# Patient Record
Sex: MALE | Race: ASIAN | NOT HISPANIC OR LATINO | Employment: OTHER | URBAN - METROPOLITAN AREA
[De-identification: names, ages, dates, MRNs, and addresses within clinical notes are randomized per-mention and may not be internally consistent; named-entity substitution may affect disease eponyms.]

---

## 2017-10-10 ENCOUNTER — OFFICE VISIT (OUTPATIENT)
Dept: SURGERY | Facility: CLINIC | Age: 57
End: 2017-10-10

## 2017-10-10 VITALS
HEIGHT: 72 IN | WEIGHT: 315 LBS | BODY MASS INDEX: 42.66 KG/M2 | SYSTOLIC BLOOD PRESSURE: 162 MMHG | DIASTOLIC BLOOD PRESSURE: 88 MMHG

## 2017-10-10 DIAGNOSIS — R10.9 RIGHT FLANK PAIN: Primary | ICD-10-CM

## 2017-10-10 DIAGNOSIS — R13.19 ESOPHAGEAL DYSPHAGIA: ICD-10-CM

## 2017-10-10 PROBLEM — I10 HYPERTENSION: Status: ACTIVE | Noted: 2017-04-26

## 2017-10-10 PROBLEM — I10 BENIGN ESSENTIAL HYPERTENSION: Status: ACTIVE | Noted: 2017-10-10

## 2017-10-10 PROBLEM — E03.9 HYPOTHYROID: Status: ACTIVE | Noted: 2017-07-26

## 2017-10-10 PROBLEM — F41.9 ANXIETY: Status: ACTIVE | Noted: 2017-10-10

## 2017-10-10 PROBLEM — M48.02 CERVICAL STENOSIS OF SPINE: Status: ACTIVE | Noted: 2017-09-11

## 2017-10-10 PROBLEM — F52.21 ERECTILE DYSFUNCTION OF NONORGANIC ORIGIN: Status: ACTIVE | Noted: 2017-10-10

## 2017-10-10 PROBLEM — G47.00 INSOMNIA: Status: ACTIVE | Noted: 2017-10-10

## 2017-10-10 PROBLEM — E29.1 HYPOGONADISM IN MALE: Status: ACTIVE | Noted: 2017-07-26

## 2017-10-10 PROCEDURE — 99202 OFFICE O/P NEW SF 15 MIN: CPT | Performed by: SURGERY

## 2017-10-10 NOTE — PROGRESS NOTES
PATIENT INFORMATION  Larry Walters  C/o bulge on R flank, has had for a while, ABD pain recently, no recent imaging     - 1960    CHIEF COMPLAINT  Chief Complaint   Patient presents with   • Abdominal Pain       HISTORY OF PRESENT ILLNESS  HPI he complains of progressive swelling and pain in his right flank.  He noticed this after his laparoscopic gastric bypass and it has increased.  He also complains of some food getting stuck when he swallows.        REVIEW OF SYSTEMS  Review of Systems   Constitutional: Negative.    HENT: Negative.    Eyes: Negative.    Respiratory: Negative.    Cardiovascular: Negative.    Gastrointestinal: Positive for abdominal pain.        Less frequent   Endocrine: Negative.    Genitourinary: Negative.    Musculoskeletal: Negative.    Skin: Negative.    Allergic/Immunologic: Negative.    Neurological: Negative.    Hematological: Negative.    Psychiatric/Behavioral: Negative.          ACTIVE PROBLEMS  Patient Active Problem List    Diagnosis   • Anxiety [F41.9]   • Benign essential hypertension [I10]   • Erectile dysfunction of nonorganic origin [F52.21]   • Insomnia [G47.00]   • Cervical stenosis of spine [M48.02]   • Hypogonadism in male [E29.1]   • Hypothyroid [E03.9]   • Hypertension [I10]         PAST MEDICAL HISTORY  Past Medical History:   Diagnosis Date   • Knee swelling          SURGICAL HISTORY  History reviewed. No pertinent surgical history.      FAMILY HISTORY  Family History   Problem Relation Age of Onset   • Diabetes Mother    • Heart failure Mother    • Hypertension Mother    • Heart disease Father          SOCIAL HISTORY  Social History     Occupational History   • Not on file.     Social History Main Topics   • Smoking status: Never Smoker   • Smokeless tobacco: Never Used   • Alcohol use Yes      Comment: occ.   • Drug use: Not on file   • Sexual activity: Not on file         CURRENT MEDICATIONS    Current Outpatient Prescriptions:   •  diclofenac (VOLTAREN) 75 MG  "EC tablet, TAKE ONE TABLET BY MOUTH TWICE A DAY, Disp: 60 tablet, Rfl: 0  •  furosemide (LASIX) 40 MG tablet, TAKE ONE TABLET BY MOUTH EVERY DAY, Disp: 90 tablet, Rfl: 0  •  LORazepam (ATIVAN) 2 MG tablet, TAKE ONE TABLET BY MOUTH EVERY 6 HOURS, Disp: 90 tablet, Rfl: 0    ALLERGIES  Review of patient's allergies indicates no known allergies.    VITALS  Vitals:    10/10/17 0832   BP: 162/88   Weight: (!) 333 lb (151 kg)   Height: 72\" (182.9 cm)       LAST RESULTS   Conversion Encounter on 09/18/2015   Component Date Value Ref Range Status   • WBC 09/18/2015 4.83  4.80 - 10.80 K/cumm Final   • RBC 09/18/2015 4.35* 4.70 - 6.10 Million Final   • Hemoglobin 09/18/2015 8.2* 14.0 - 18.0 g/dL Final   • Hematocrit 09/18/2015 30.6* 42.0 - 52.0 % Final   • MCV 09/18/2015 70.3* 80.0 - 94.0 fL Final   • MCH 09/18/2015 18.9* 27.0 - 31.0 pg Final   • MCHC 09/18/2015 26.8* 31.0 - 37.0 g/dL Final   • RDW 09/18/2015 18.5* 11.5 - 14.5 % Final   • Platelets 09/18/2015 361  140 - 500 K/cumm Final   • Neutrophil Rel % 09/18/2015 62  45 - 70 % Final   • Lymphocyte Rel % 09/18/2015 27  20 - 45 % Final   • Monocyte Rel % 09/18/2015 8  3 - 8 % Final   • Eosinophil Rel % 09/18/2015 3  0 - 4 % Final   • Basophil Rel % 09/18/2015 1  0 - 2 % Final   • Neutrophils Absolute 09/18/2015 2.99  1.50 - 8.30 K/cumm Final   • Lymphocytes Absolute 09/18/2015 1.30  0.60 - 4.80 K/cumm Final   • Monocytes Absolute 09/18/2015 0.36  0.00 - 1.00 K/cumm Final   • Eosinophils Absolute 09/18/2015 0.14  0.10 - 0.30 K/cumm Final   • Basophils Absolute 09/18/2015 0.03  0.00 - 0.20 K/cumm Final   • Immature Granulocyte Rel % 09/18/2015 0.0  0.0 - 0.6 % Final   • Glucose 09/18/2015 92  65 - 99 mg/dL Final   • BUN 09/18/2015 15  6 - 20 mg/dL Final   • Creatinine 09/18/2015 0.70* 0.76 - 1.27 mg/dL Final   • eGFR Non  Am 09/18/2015 >60  mL/min/1.732 Final   • eGFR African Am 09/18/2015 >60  mL/min/1.732 Final   • BUN/Creatinine Ratio 09/18/2015 21   Final   • " Sodium 09/18/2015 142  136 - 145 mmol/L Final   • Potassium 09/18/2015 4.3  3.5 - 5.2 mmol/L Final   • Chloride 09/18/2015 103  98 - 107 mmol/L Final   • Total CO2 09/18/2015 24  22 - 29 mmol/L Final   • Calcium 09/18/2015 8.9  8.6 - 10.5 mg/dL Final   • Total Protein 09/18/2015 7.4  6.0 - 8.5 g/dL Final   • Albumin 09/18/2015 4.2  3.5 - 5.2 g/dL Final   • Globulin 09/18/2015 3.2  g/dL Final   • A/G Ratio 09/18/2015 1   Final   • Total Bilirubin 09/18/2015 0.3  0.1 - 1.2 mg/dL Final   • Alkaline Phosphatase 09/18/2015 79  40 - 129 U/L Final   • AST (SGOT) 09/18/2015 34  5 - 40 U/L Final   • ALT (SGPT) 09/18/2015 28  5 - 41 U/L Final   • PSA 09/18/2015 1.15  0.00 - 4.00 ng/mL Final     No results found.    PHYSICAL EXAM  Physical Exam overweight white male in no active distress.  He has asymmetry of his right flank with a probable flank hernia.  His abdominal exam is benign.    ASSESSMENT  Flank pain probable hernia, dysphagia      PLAN  The risks benefits and options were discussed with the patient in detail.  Feel that we should proceed with a CT scan of his abdomen and pelvis to delineate the anatomy for possible flank hernia.  We will check an upper GI with barium swallow for his dysphagia.  He may need to see gastroenterology.  I will see him back after the study is complete.

## 2017-10-13 ENCOUNTER — APPOINTMENT (OUTPATIENT)
Dept: CT IMAGING | Facility: HOSPITAL | Age: 57
End: 2017-10-13
Attending: SURGERY

## 2017-10-13 ENCOUNTER — HOSPITAL ENCOUNTER (OUTPATIENT)
Dept: CT IMAGING | Facility: HOSPITAL | Age: 57
Discharge: HOME | End: 2017-10-13
Attending: SURGERY | Admitting: SURGERY

## 2017-10-13 DIAGNOSIS — R10.9 RIGHT FLANK PAIN: ICD-10-CM

## 2017-10-13 PROCEDURE — 74176 CT ABD & PELVIS W/O CONTRAST: CPT

## 2017-10-16 ENCOUNTER — HOSPITAL ENCOUNTER (OUTPATIENT)
Dept: GENERAL RADIOLOGY | Facility: HOSPITAL | Age: 57
Discharge: HOME | End: 2017-10-16
Attending: SURGERY | Admitting: SURGERY

## 2017-10-16 DIAGNOSIS — R13.19 ESOPHAGEAL DYSPHAGIA: ICD-10-CM

## 2017-10-16 PROCEDURE — 74247: CPT

## 2017-10-17 ENCOUNTER — OFFICE VISIT (OUTPATIENT)
Dept: SURGERY | Facility: CLINIC | Age: 57
End: 2017-10-17

## 2017-10-17 VITALS
DIASTOLIC BLOOD PRESSURE: 100 MMHG | SYSTOLIC BLOOD PRESSURE: 182 MMHG | HEIGHT: 72 IN | WEIGHT: 315 LBS | BODY MASS INDEX: 42.66 KG/M2

## 2017-10-17 DIAGNOSIS — K40.20 NON-RECURRENT BILATERAL INGUINAL HERNIA WITHOUT OBSTRUCTION OR GANGRENE: ICD-10-CM

## 2017-10-17 DIAGNOSIS — K45.8 LUMBAR HERNIA: Primary | ICD-10-CM

## 2017-10-17 PROCEDURE — 99212 OFFICE O/P EST SF 10 MIN: CPT | Performed by: SURGERY

## 2017-10-17 NOTE — PROGRESS NOTES
PATIENT INFORMATION  Larry Walters   F/U CT SCAN AND UGI, SX UNCHANGED SINCE LAST OV     - 1960    CHIEF COMPLAINT  Chief Complaint   Patient presents with   • Follow-up       HISTORY OF PRESENT ILLNESS  HPI swelling and intermittent discomfort in his right flank.  He says that food occasionally gets stuck when he swallows.  He denies any groin swelling or pain.        REVIEW OF SYSTEMS  Review of Systems knee pain and bilateral shoulder pain, neck pain      ACTIVE PROBLEMS  Patient Active Problem List    Diagnosis   • Anxiety [F41.9]   • Benign essential hypertension [I10]   • Erectile dysfunction of nonorganic origin [F52.21]   • Insomnia [G47.00]   • Cervical stenosis of spine [M48.02]   • Hypogonadism in male [E29.1]   • Hypothyroid [E03.9]   • Hypertension [I10]         PAST MEDICAL HISTORY  Past Medical History:   Diagnosis Date   • Knee swelling          SURGICAL HISTORY  No past surgical history on file.      FAMILY HISTORY  Family History   Problem Relation Age of Onset   • Diabetes Mother    • Heart failure Mother    • Hypertension Mother    • Heart disease Father          SOCIAL HISTORY  Social History     Occupational History   • Not on file.     Social History Main Topics   • Smoking status: Never Smoker   • Smokeless tobacco: Never Used   • Alcohol use Yes      Comment: occ.   • Drug use: Not on file   • Sexual activity: Not on file         CURRENT MEDICATIONS    Current Outpatient Prescriptions:   •  diclofenac (VOLTAREN) 75 MG EC tablet, TAKE ONE TABLET BY MOUTH TWICE A DAY, Disp: 60 tablet, Rfl: 0  •  furosemide (LASIX) 40 MG tablet, TAKE ONE TABLET BY MOUTH EVERY DAY, Disp: 90 tablet, Rfl: 0  •  LORazepam (ATIVAN) 2 MG tablet, TAKE ONE TABLET BY MOUTH EVERY 6 HOURS, Disp: 90 tablet, Rfl: 0    ALLERGIES  Review of patient's allergies indicates no known allergies.    VITALS  There were no vitals filed for this visit.    LAST RESULTS   Conversion Encounter on 2015   Component Date Value  Ref Range Status   • WBC 09/18/2015 4.83  4.80 - 10.80 K/cumm Final   • RBC 09/18/2015 4.35* 4.70 - 6.10 Million Final   • Hemoglobin 09/18/2015 8.2* 14.0 - 18.0 g/dL Final   • Hematocrit 09/18/2015 30.6* 42.0 - 52.0 % Final   • MCV 09/18/2015 70.3* 80.0 - 94.0 fL Final   • MCH 09/18/2015 18.9* 27.0 - 31.0 pg Final   • MCHC 09/18/2015 26.8* 31.0 - 37.0 g/dL Final   • RDW 09/18/2015 18.5* 11.5 - 14.5 % Final   • Platelets 09/18/2015 361  140 - 500 K/cumm Final   • Neutrophil Rel % 09/18/2015 62  45 - 70 % Final   • Lymphocyte Rel % 09/18/2015 27  20 - 45 % Final   • Monocyte Rel % 09/18/2015 8  3 - 8 % Final   • Eosinophil Rel % 09/18/2015 3  0 - 4 % Final   • Basophil Rel % 09/18/2015 1  0 - 2 % Final   • Neutrophils Absolute 09/18/2015 2.99  1.50 - 8.30 K/cumm Final   • Lymphocytes Absolute 09/18/2015 1.30  0.60 - 4.80 K/cumm Final   • Monocytes Absolute 09/18/2015 0.36  0.00 - 1.00 K/cumm Final   • Eosinophils Absolute 09/18/2015 0.14  0.10 - 0.30 K/cumm Final   • Basophils Absolute 09/18/2015 0.03  0.00 - 0.20 K/cumm Final   • Immature Granulocyte Rel % 09/18/2015 0.0  0.0 - 0.6 % Final   • Glucose 09/18/2015 92  65 - 99 mg/dL Final   • BUN 09/18/2015 15  6 - 20 mg/dL Final   • Creatinine 09/18/2015 0.70* 0.76 - 1.27 mg/dL Final   • eGFR Non  Am 09/18/2015 >60  mL/min/1.732 Final   • eGFR African Am 09/18/2015 >60  mL/min/1.732 Final   • BUN/Creatinine Ratio 09/18/2015 21   Final   • Sodium 09/18/2015 142  136 - 145 mmol/L Final   • Potassium 09/18/2015 4.3  3.5 - 5.2 mmol/L Final   • Chloride 09/18/2015 103  98 - 107 mmol/L Final   • Total CO2 09/18/2015 24  22 - 29 mmol/L Final   • Calcium 09/18/2015 8.9  8.6 - 10.5 mg/dL Final   • Total Protein 09/18/2015 7.4  6.0 - 8.5 g/dL Final   • Albumin 09/18/2015 4.2  3.5 - 5.2 g/dL Final   • Globulin 09/18/2015 3.2  g/dL Final   • A/G Ratio 09/18/2015 1   Final   • Total Bilirubin 09/18/2015 0.3  0.1 - 1.2 mg/dL Final   • Alkaline Phosphatase 09/18/2015 79  40 -  129 U/L Final   • AST (SGOT) 09/18/2015 34  5 - 40 U/L Final   • ALT (SGPT) 09/18/2015 28  5 - 41 U/L Final   • PSA 09/18/2015 1.15  0.00 - 4.00 ng/mL Final     Ct Abdomen Pelvis Without Contrast    Result Date: 10/13/2017  Narrative: CT ABDOMEN AND PELVIS, NONCONTRAST, 10/13/2017:  HISTORY: 57-year-old male with hernia-like bulge in the right flank, present for several years but enlarging recently.  TECHNIQUE: CT examination of the abdomen and pelvis without oral or IV contrast. Radiation dose reduction techniques were utilized, including automated exposure control and exposure modulation based on body size.  ABDOMEN FINDINGS: There is a full-thickness gap in the posterolateral right mid abdominal wall involving all three muscular layers. In axial plane, this gap measures about 5.5 cm. The longitudinal extent of this opening measures about 11.5 cm measured in coronal plane. Transversalis fascia is intact. There is outward bulging of the entire right lateral abdominal wall and abdominal contents including abdominal retroperitoneal fat, bowel and the right inferior liver margin.  Postop changes gastric bypass. No distal esophageal dilatation or gastric distention. Liver, pancreas, spleen and kidneys are within normal limits. No bile duct dilatation. Normal caliber abdominal aorta.  Small bowel and colon are normal in caliber and appearance without contrast. Normal appendix.  PELVIS FINDINGS: Small bilateral inguinal hernias containing pelvic fat. Urinary bladder, prostate and rectum are within normal limits.  Limited lung base images show no active disease. Severe multilevel degenerative disc disease, greatest at L1-2, L2-3 and L5-S1 along with severe degenerative facet arthropathy throughout the lumbar spine.      Impression: 1. Broad-based right lateral abdominal wall hernia as detailed above. 2. Small bilateral fat-containing inguinal hernias. 3. Postop changes gastric bypass.  This report was finalized on  10/13/2017 12:33 PM by Dr. King Isaac MD.      Fl Upper Gi W Air Contrast And Kub    Result Date: 10/16/2017  Narrative: INDICATION: Food sticking in the throat for one month. Prior gastric bypass 10 years ago.  TECHNIQUE:  AIR CONTRAST UPPER GI SERIES  COMPARISON: CT abdomen pelvis 10/13/2017  FINDINGS:  ESOPHAGUS:    Motility:  There is normal motility in the upper one half of the thoracic esophagus. There is mild generalized dysmotility the lower one half of the esophagus.    Mucosa:  No stricture, mass, extrinsic mass effect. There is some mild tortuosity of the thoracic esophagus.   Distensibility: The distal thoracic esophagus is mildly distended, however no stricture is identified.  GASTROESOPHAGEAL JUNCTION:  Patient's undergone prior gastric bypass procedure. A surgical anastomoses are unremarkable.  GASTROESOPHAGEAL REFLUX:  None observed.  Stomach and duodenum are not assessed given the gastric bypass procedure.  Fluoroscopic time 1.48 minutes, 40 images acquired.      Impression:  Mid to distal esophageal dysmotility and mild esophageal distention.  This report was finalized on 10/16/2017 9:20 AM by Dr. Bret Soria MD.        PHYSICAL EXAM  Physical Exam overweight male in no active distress.  His parents swallow upper GI showed his anastomoses to be okay there was no evidence of any stricture.  It did show some dysmotility in the lower third of his esophagus.  CT scan of the abdomen showed a large right flank hernia and small bilateral inguinal hernias with fatty contents.  I reviewed the CT scan myself    ASSESSMENT  Lumbar hernia, bilateral inguinal hernia      PLAN  I discussed the risks benefits and options with him in detail.  He is asymptomatic from the groin hernias and as such we will just observe him for that.  I discussed signs and symptoms to look for.  As far as the right flank hernia goes this would be difficult to repair with approximately 40% recurrence rate.  I discussed his  options with this in detail.  He will defer that until after he gets some of his other shoulder knee and neck surgeries complete.  He would be limited in his physical therapy because of the hernia repair if he would proceed now with that.  I feel the hernia has a low risk of incarceration.  He will give me a call after he's had some the other procedures accomplished to plan his surgical intervention for his hernia.  As far as he esophagus goes I offered to refer him to gastroenterology and he deferred at this point.

## 2017-10-20 ENCOUNTER — APPOINTMENT (OUTPATIENT)
Dept: GENERAL RADIOLOGY | Facility: HOSPITAL | Age: 57
End: 2017-10-20
Attending: SURGERY

## 2017-10-23 ENCOUNTER — APPOINTMENT (OUTPATIENT)
Dept: GENERAL RADIOLOGY | Facility: HOSPITAL | Age: 57
End: 2017-10-23
Attending: SURGERY

## 2018-05-26 ENCOUNTER — LAB REQUISITION (OUTPATIENT)
Dept: LAB | Facility: HOSPITAL | Age: 58
End: 2018-05-26

## 2018-05-26 DIAGNOSIS — Z00.00 ROUTINE GENERAL MEDICAL EXAMINATION AT A HEALTH CARE FACILITY: ICD-10-CM

## 2018-05-26 LAB
ANION GAP SERPL CALCULATED.3IONS-SCNC: 13.9 MMOL/L
BACTERIA UR QL AUTO: ABNORMAL /HPF
BASOPHILS # BLD AUTO: 0.03 10*3/MM3 (ref 0–0.2)
BASOPHILS NFR BLD AUTO: 0.7 % (ref 0–1.5)
BILIRUB UR QL STRIP: NEGATIVE
BUN BLD-MCNC: 10 MG/DL (ref 6–20)
BUN/CREAT SERPL: 13.5 (ref 7–25)
CALCIUM SPEC-SCNC: 8.8 MG/DL (ref 8.6–10.5)
CHLORIDE SERPL-SCNC: 104 MMOL/L (ref 98–107)
CLARITY UR: CLEAR
CO2 SERPL-SCNC: 23.1 MMOL/L (ref 22–29)
COLOR UR: YELLOW
CREAT BLD-MCNC: 0.74 MG/DL (ref 0.76–1.27)
DEPRECATED RDW RBC AUTO: 46.2 FL (ref 37–54)
EOSINOPHIL # BLD AUTO: 0.13 10*3/MM3 (ref 0–0.7)
EOSINOPHIL NFR BLD AUTO: 2.8 % (ref 0.3–6.2)
ERYTHROCYTE [DISTWIDTH] IN BLOOD BY AUTOMATED COUNT: 14 % (ref 11.5–14.5)
GFR SERPL CREATININE-BSD FRML MDRD: 109 ML/MIN/1.73
GLUCOSE BLD-MCNC: 92 MG/DL (ref 65–99)
GLUCOSE UR STRIP-MCNC: NEGATIVE MG/DL
HCT VFR BLD AUTO: 43.8 % (ref 40.4–52.2)
HGB BLD-MCNC: 14.5 G/DL (ref 13.7–17.6)
HGB UR QL STRIP.AUTO: ABNORMAL
HYALINE CASTS UR QL AUTO: ABNORMAL /LPF
IMM GRANULOCYTES # BLD: 0 10*3/MM3 (ref 0–0.03)
IMM GRANULOCYTES NFR BLD: 0 % (ref 0–0.5)
KETONES UR QL STRIP: NEGATIVE
LEUKOCYTE ESTERASE UR QL STRIP.AUTO: NEGATIVE
LYMPHOCYTES # BLD AUTO: 1.14 10*3/MM3 (ref 0.9–4.8)
LYMPHOCYTES NFR BLD AUTO: 24.9 % (ref 19.6–45.3)
MCH RBC QN AUTO: 30 PG (ref 27–32.7)
MCHC RBC AUTO-ENTMCNC: 33.1 G/DL (ref 32.6–36.4)
MCV RBC AUTO: 90.7 FL (ref 79.8–96.2)
MONOCYTES # BLD AUTO: 0.52 10*3/MM3 (ref 0.2–1.2)
MONOCYTES NFR BLD AUTO: 11.4 % (ref 5–12)
NEUTROPHILS # BLD AUTO: 2.76 10*3/MM3 (ref 1.9–8.1)
NEUTROPHILS NFR BLD AUTO: 60.2 % (ref 42.7–76)
NITRITE UR QL STRIP: NEGATIVE
PH UR STRIP.AUTO: <=5 [PH] (ref 5–8)
PLATELET # BLD AUTO: 292 10*3/MM3 (ref 140–500)
PMV BLD AUTO: 9.2 FL (ref 6–12)
POTASSIUM BLD-SCNC: 4 MMOL/L (ref 3.5–5.2)
PROT UR QL STRIP: NEGATIVE
RBC # BLD AUTO: 4.83 10*6/MM3 (ref 4.6–6)
RBC # UR: ABNORMAL /HPF
REF LAB TEST METHOD: ABNORMAL
SODIUM BLD-SCNC: 141 MMOL/L (ref 136–145)
SP GR UR STRIP: 1.02 (ref 1–1.03)
SQUAMOUS #/AREA URNS HPF: ABNORMAL /HPF
UROBILINOGEN UR QL STRIP: ABNORMAL
WBC NRBC COR # BLD: 4.58 10*3/MM3 (ref 4.5–10.7)
WBC UR QL AUTO: ABNORMAL /HPF

## 2018-05-26 PROCEDURE — 85025 COMPLETE CBC W/AUTO DIFF WBC: CPT

## 2018-05-26 PROCEDURE — 81001 URINALYSIS AUTO W/SCOPE: CPT

## 2018-05-26 PROCEDURE — 80048 BASIC METABOLIC PNL TOTAL CA: CPT

## 2018-07-18 ENCOUNTER — APPOINTMENT (OUTPATIENT)
Dept: GENERAL RADIOLOGY | Facility: HOSPITAL | Age: 58
End: 2018-07-18

## 2018-07-18 ENCOUNTER — HOSPITAL ENCOUNTER (EMERGENCY)
Facility: HOSPITAL | Age: 58
Discharge: HOME | End: 2018-07-19
Attending: EMERGENCY MEDICINE | Admitting: EMERGENCY MEDICINE

## 2018-07-18 ENCOUNTER — APPOINTMENT (OUTPATIENT)
Dept: CT IMAGING | Facility: HOSPITAL | Age: 58
End: 2018-07-18
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
## 2018-07-18 DIAGNOSIS — S09.90XA MINOR HEAD INJURY WITHOUT LOSS OF CONSCIOUSNESS, INITIAL ENCOUNTER: ICD-10-CM
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
## 2018-07-18 DIAGNOSIS — S01.81XA LACERATION OF FOREHEAD, INITIAL ENCOUNTER: Primary | ICD-10-CM

## 2018-07-18 LAB
ALBUMIN SERPL-MCNC: 3.7 G/DL (ref 3.5–5.2)
ALBUMIN/GLOB SERPL: 1.1 G/DL
ALP SERPL-CCNC: 79 U/L (ref 39–117)
ALT SERPL W P-5'-P-CCNC: 49 U/L (ref 1–41)
ANION GAP SERPL CALCULATED.3IONS-SCNC: 12.8 MMOL/L
AST SERPL-CCNC: 36 U/L (ref 1–40)
BASOPHILS # BLD AUTO: 0.02 10*3/MM3 (ref 0–0.2)
BASOPHILS NFR BLD AUTO: 0.2 % (ref 0–1.5)
BILIRUB SERPL-MCNC: 0.6 MG/DL (ref 0.1–1.2)
BUN BLD-MCNC: 14 MG/DL (ref 6–20)
BUN/CREAT SERPL: 17.9 (ref 7–25)
CALCIUM SPEC-SCNC: 8.2 MG/DL (ref 8.6–10.5)
CHLORIDE SERPL-SCNC: 99 MMOL/L (ref 98–107)
CO2 SERPL-SCNC: 22.2 MMOL/L (ref 22–29)
CREAT BLD-MCNC: 0.78 MG/DL (ref 0.76–1.27)
DEPRECATED RDW RBC AUTO: 47.8 FL (ref 37–54)
EOSINOPHIL # BLD AUTO: 0.09 10*3/MM3 (ref 0–0.7)
EOSINOPHIL NFR BLD AUTO: 0.7 % (ref 0.3–6.2)
ERYTHROCYTE [DISTWIDTH] IN BLOOD BY AUTOMATED COUNT: 14.3 % (ref 11.5–14.5)
GFR SERPL CREATININE-BSD FRML MDRD: 103 ML/MIN/1.73
GLOBULIN UR ELPH-MCNC: 3.3 GM/DL
GLUCOSE BLD-MCNC: 117 MG/DL (ref 65–99)
HCT VFR BLD AUTO: 44.2 % (ref 40.4–52.2)
HGB BLD-MCNC: 14.4 G/DL (ref 13.7–17.6)
IMM GRANULOCYTES # BLD: 0.04 10*3/MM3 (ref 0–0.03)
IMM GRANULOCYTES NFR BLD: 0.3 % (ref 0–0.5)
LYMPHOCYTES # BLD AUTO: 0.54 10*3/MM3 (ref 0.9–4.8)
LYMPHOCYTES NFR BLD AUTO: 4.3 % (ref 19.6–45.3)
MAGNESIUM SERPL-MCNC: 1.8 MG/DL (ref 1.6–2.6)
MCH RBC QN AUTO: 29.9 PG (ref 27–32.7)
MCHC RBC AUTO-ENTMCNC: 32.6 G/DL (ref 32.6–36.4)
MCV RBC AUTO: 91.7 FL (ref 79.8–96.2)
MONOCYTES # BLD AUTO: 0.76 10*3/MM3 (ref 0.2–1.2)
MONOCYTES NFR BLD AUTO: 6.1 % (ref 5–12)
NEUTROPHILS # BLD AUTO: 11.07 10*3/MM3 (ref 1.9–8.1)
NEUTROPHILS NFR BLD AUTO: 88.4 % (ref 42.7–76)
PLATELET # BLD AUTO: 206 10*3/MM3 (ref 140–500)
PMV BLD AUTO: 9.4 FL (ref 6–12)
POTASSIUM BLD-SCNC: 3.5 MMOL/L (ref 3.5–5.2)
PROT SERPL-MCNC: 7 G/DL (ref 6–8.5)
RBC # BLD AUTO: 4.82 10*6/MM3 (ref 4.6–6)
SODIUM BLD-SCNC: 134 MMOL/L (ref 136–145)
TROPONIN T SERPL-MCNC: <0.01 NG/ML (ref 0–0.03)
WBC NRBC COR # BLD: 12.52 10*3/MM3 (ref 4.5–10.7)

## 2018-07-18 PROCEDURE — 99284 EMERGENCY DEPT VISIT MOD MDM: CPT

## 2018-07-18 PROCEDURE — 85025 COMPLETE CBC W/AUTO DIFF WBC: CPT | Performed by: PHYSICIAN ASSISTANT

## 2018-07-18 PROCEDURE — 93005 ELECTROCARDIOGRAM TRACING: CPT | Performed by: PHYSICIAN ASSISTANT

## 2018-07-18 PROCEDURE — 80053 COMPREHEN METABOLIC PANEL: CPT | Performed by: PHYSICIAN ASSISTANT

## 2018-07-18 PROCEDURE — 83735 ASSAY OF MAGNESIUM: CPT | Performed by: PHYSICIAN ASSISTANT

## 2018-07-18 PROCEDURE — 93010 ELECTROCARDIOGRAM REPORT: CPT | Performed by: INTERNAL MEDICINE

## 2018-07-18 PROCEDURE — 72125 CT NECK SPINE W/O DYE: CPT

## 2018-07-18 PROCEDURE — 84484 ASSAY OF TROPONIN QUANT: CPT | Performed by: PHYSICIAN ASSISTANT

## 2018-07-18 PROCEDURE — 71045 X-RAY EXAM CHEST 1 VIEW: CPT

## 2018-07-18 PROCEDURE — 70450 CT HEAD/BRAIN W/O DYE: CPT

## 2018-07-18 RX ORDER — METOPROLOL TARTRATE 50 MG/1
100 TABLET, FILM COATED ORAL 2 TIMES DAILY
COMMUNITY

## 2018-07-18 RX ORDER — LORATADINE 10 MG/1
1 CAPSULE, LIQUID FILLED ORAL
COMMUNITY

## 2018-07-18 RX ORDER — POTASSIUM CHLORIDE 750 MG/1
40 TABLET, FILM COATED, EXTENDED RELEASE ORAL DAILY
COMMUNITY

## 2018-07-18 RX ORDER — AMIODARONE HYDROCHLORIDE 400 MG/1
400 TABLET ORAL 2 TIMES DAILY
COMMUNITY
Start: 2018-02-26

## 2018-07-18 RX ORDER — LIDOCAINE HYDROCHLORIDE AND EPINEPHRINE 10; 10 MG/ML; UG/ML
20 INJECTION, SOLUTION INFILTRATION; PERINEURAL ONCE
Status: COMPLETED | OUTPATIENT
Start: 2018-07-18 | End: 2018-07-18

## 2018-07-18 RX ORDER — FOLIC ACID 1 MG/1
1 TABLET ORAL DAILY
COMMUNITY

## 2018-07-18 RX ORDER — AMLODIPINE BESYLATE 5 MG/1
5 TABLET ORAL DAILY
COMMUNITY
Start: 2018-02-27

## 2018-07-18 RX ORDER — SODIUM CHLORIDE 0.9 % (FLUSH) 0.9 %
10 SYRINGE (ML) INJECTION AS NEEDED
Status: DISCONTINUED | OUTPATIENT
Start: 2018-07-18 | End: 2018-07-19 | Stop reason: HOSPADM

## 2018-07-18 RX ORDER — THIAMINE HCL 50 MG
100 TABLET ORAL DAILY
COMMUNITY

## 2018-07-18 RX ORDER — FLUOXETINE HYDROCHLORIDE 20 MG/1
20 CAPSULE ORAL DAILY
COMMUNITY
Start: 2018-02-27

## 2018-07-18 RX ORDER — TRAZODONE HYDROCHLORIDE 50 MG/1
50 TABLET ORAL NIGHTLY
COMMUNITY

## 2018-07-18 RX ORDER — LEVETIRACETAM 500 MG/1
500 TABLET ORAL 2 TIMES DAILY
COMMUNITY
Start: 2018-05-18

## 2018-07-18 RX ORDER — MELATONIN
1000 DAILY
COMMUNITY

## 2018-07-18 RX ORDER — POTASSIUM CITRATE 5 MEQ/1
TABLET, EXTENDED RELEASE ORAL
COMMUNITY

## 2018-07-18 RX ORDER — VALSARTAN 160 MG/1
160 TABLET ORAL DAILY
COMMUNITY

## 2018-07-18 RX ORDER — AMANTADINE HYDROCHLORIDE 100 MG/1
100 CAPSULE, GELATIN COATED ORAL 2 TIMES DAILY
COMMUNITY

## 2018-07-18 RX ADMIN — LIDOCAINE HYDROCHLORIDE AND EPINEPHRINE 20 ML: 10; 10 INJECTION, SOLUTION INFILTRATION; PERINEURAL at 21:51

## 2018-07-19 VITALS
OXYGEN SATURATION: 95 % | DIASTOLIC BLOOD PRESSURE: 94 MMHG | SYSTOLIC BLOOD PRESSURE: 127 MMHG | RESPIRATION RATE: 20 BRPM | WEIGHT: 280 LBS | HEART RATE: 106 BPM | TEMPERATURE: 100 F | HEIGHT: 72 IN | BODY MASS INDEX: 37.93 KG/M2

## 2019-07-23 ENCOUNTER — TRANSCRIBE ORDERS (OUTPATIENT)
Dept: ADMINISTRATIVE | Facility: HOSPITAL | Age: 59
End: 2019-07-23

## 2019-07-23 ENCOUNTER — HOSPITAL ENCOUNTER (OUTPATIENT)
Dept: GENERAL RADIOLOGY | Facility: HOSPITAL | Age: 59
Discharge: HOME | End: 2019-07-23
Admitting: PHYSICAL MEDICINE & REHABILITATION

## 2019-07-23 DIAGNOSIS — N28.89 RENAL MASS: Primary | ICD-10-CM

## 2019-07-23 DIAGNOSIS — M54.5 LOW BACK PAIN, UNSPECIFIED BACK PAIN LATERALITY, UNSPECIFIED CHRONICITY, WITH SCIATICA PRESENCE UNSPECIFIED: ICD-10-CM

## 2019-07-23 DIAGNOSIS — M54.5 LOW BACK PAIN, UNSPECIFIED BACK PAIN LATERALITY, UNSPECIFIED CHRONICITY, WITH SCIATICA PRESENCE UNSPECIFIED: Primary | ICD-10-CM

## 2019-07-23 PROCEDURE — 72120 X-RAY BEND ONLY L-S SPINE: CPT

## 2020-03-11 ENCOUNTER — TRANSCRIBE ORDERS (OUTPATIENT)
Dept: PHYSICAL THERAPY | Facility: HOSPITAL | Age: 60
End: 2020-03-11

## 2020-03-11 DIAGNOSIS — M43.06 LUMBAR SPONDYLOLYSIS: Primary | ICD-10-CM

## 2020-03-17 ENCOUNTER — TREATMENT (OUTPATIENT)
Dept: PHYSICAL THERAPY | Facility: CLINIC | Age: 60
End: 2020-03-17

## 2020-03-17 DIAGNOSIS — M25.561 CHRONIC PAIN OF BOTH KNEES: ICD-10-CM

## 2020-03-17 DIAGNOSIS — M47.816 LUMBAR SPONDYLOSIS: Primary | ICD-10-CM

## 2020-03-17 DIAGNOSIS — M25.562 CHRONIC PAIN OF BOTH KNEES: ICD-10-CM

## 2020-03-17 DIAGNOSIS — G89.29 CHRONIC PAIN OF BOTH KNEES: ICD-10-CM

## 2020-03-17 PROCEDURE — 97161 PT EVAL LOW COMPLEX 20 MIN: CPT | Performed by: PHYSICAL THERAPIST

## 2020-03-17 NOTE — PROGRESS NOTES
Physical Therapy Initial Evaluation and Plan of Care        Patient: Larry Walters   : 1960  Diagnosis/ICD-10 Code:  Lumbar spondylosis [M47.816]  Referring practitioner: Nika Hoyos DO  Date of Initial Visit: 3/17/2020  Today's Date: 3/18/2020  Patient seen for 1 sessions           Subjective Questionnaire: Oswestry: 30/50      Subjective Evaluation    History of Present Illness  Date of onset: 2020  Mechanism of injury: Pt fell while at the driving range and landed on both knees and now has severe B knee pain with walking, low back pain and pain in between the shoulder blades. He is taking Ibuprofen and using Voltaren gel.   Pt has a torn labrum in the R shldr and arthritis and L shoulder arthritis and was waiting on TSA and TKA B but had an adverse reaction to anesthesia from a cervical decompression and fusion surgery at [facility] in 2016. Xray negative for fracture. He broke his nose and had severe swelling in the L eye requiring cutting of ligaments. He has balance deficits and needs to use a walker since the fall.     Subjective comment: B knee pain following a fall  Patient Occupation: not working but goes to [facility] for rehab 3 times/wk Quality of life: fair    Pain  Current pain ratin  At best pain ratin  At worst pain ratin  Location: B knee pain lateral and posterior  Quality: throbbing, sharp, dull ache and pressure  Relieving factors: medications and ice  Aggravating factors: squatting, ambulation, stairs, sleeping, standing, prolonged positioning and repetitive movement  Progression: improved    Diagnostic Tests  X-ray: normal    Treatments  Previous treatment: medication  Current treatment: physical therapy  Patient Goals  Patient goals for therapy: increased strength, decreased pain, independence with ADLs/IADLs, increased motion and improved balance             Objective     Special Questions  Patient is experiencing night pain and disturbed sleep.       Static Posture      Knee   Genu valgus.     Comments  R knee genu valgus    Postural Observations  Seated posture: fair  Standing posture: fair  Correction of posture: has no consistent effect        Palpation   Left   Tenderness of the distal biceps femoris, distal semimembranosus, distal semitendinosus and lateral gastrocnemius.     Right Tenderness of the distal biceps femoris, distal semimembranosus, distal semitendinosus and lateral gastrocnemius.     Additional Palpation Details  No TTP in lumbar spine     Tenderness   Left Knee   Tenderness in the popliteal fossa. No tenderness in the ITB, LCL (distal), LCL (proximal), MCL (distal), MCL (proximal), medial joint line and medial patella.     Right Knee   Tenderness in the LCL (distal), LCL (proximal), MCL (distal), MCL (proximal), medial joint line, medial patella and popliteal fossa. No tenderness in the ITB.     Additional Tenderness Details  TTP at B GT    Neurological Testing     Sensation     Lumbar   Left   Intact: light touch    Right   Intact: light touch    Knee   Left Knee   Intact: light touch    Right Knee   Intact: light touch     Active Range of Motion   Left Knee   Flexion: 115 degrees   Extensor la degrees     Right Knee   Flexion: 102 degrees   Extensor lag: 15 degrees     Additional Active Range of Motion Details  Lumbar AROM grossly 25% or less with pain and instability    Patellar Mobility   Left Knee Hypomobile in the left medial and left lateral patellar tendon(s).     Right Knee Hypomobile in the medial, lateral, superior and inferior patellar tendon(s).     Patellar Static Positioning   Right Knee: medial tilt    Additional Patellar Static Positioning Details  Sits laterally and superiorly in R patellar groove    Strength/Myotome Testing     Left Hip   Planes of Motion   Flexion: 4-  Abduction: 3+  External rotation: 4    Right Hip   Planes of Motion   Flexion: 4-  Abduction: 3  External rotation: 4    Left Knee   Flexion: 4-    Right Knee    Flexion: 4-    Muscle Activation   Patient unable to activate left transverse abdominals, left multifidus, right transverse abdominals and right multifidus.     Tests     Lumbar     Left   Negative passive SLR, sural bias and tibial bias.     Right   Negative passive SLR, sural bias and tibial bias.     Left Knee   Negative valgus stress test at 0 degrees and varus stress test at 0 degrees.     Right Knee   Positive valgus stress test at 0 degrees.   Negative varus stress test at 0 degrees.     Swelling     Left Knee Girth Measurement (cm)   Joint line: 44 cm    Right Knee Girth Measurement (cm)   Joint line: 48 cm    Ambulation     Observational Gait     Additional Observational Gait Details  Wide ZULY, eversion of L foot and inversion of R foot, and lateral trunk lean with decreased step length using front wheeled walker    Functional Assessment     Comments  Tinetti 17/28    General Comments     Knee Comments  B HS 30 degrees in supine 90/90  B hip flexors NT due to discomfort getting into the position supine or S/L         Assessment & Plan     Assessment  Impairments: abnormal muscle firing, abnormal or restricted ROM, impaired physical strength, lacks appropriate home exercise program and pain with function  Assessment details: Pt presents with pain in the low back and B knees since falling at the driving range. He has limited lumbar ROM, knee ROM, strength in the LE's globally, reliance on AD for ambulation with gait deficits.  He has tenderness at the knees and postural and balance deficits as well and would benefit from skilled PT to address his deficits while reducing his pain.  Prognosis: good  Functional Limitations: carrying objects, lifting, sleeping, walking, pulling, pushing, uncomfortable because of pain, sitting and stooping  Goals  Plan Goals: STG (in 4 weeks)  1) Pt will report a decrease in pain to 7/10 max when walking  2) Pt will be able to complete HEP with no increase in pain and with  proper technique  3) Pt will be able to stand and complete 10 reps of standing LE AROM in all planes without seated rest break indicating reduced pain and improved endurance    LTG (in 8 weeks)  1) Pt will score 10% less on Oswestry  2) Pt will report min TTP at B knees   3) Pt will demonstrate improved balance and reduced pain allowing ambulation without walker  4) Pt will demonstrate 50% or more lumbar AROM without UE support    Plan  Therapy options: will be seen for skilled physical therapy services  Planned modality interventions: high voltage pulsed current (pain management), high voltage pulsed current (spasm management), ultrasound, TENS, electrical stimulation/Russian stimulation, thermotherapy (hydrocollator packs), cryotherapy and traction  Planned therapy interventions: abdominal trunk stabilization, functional ROM exercises, flexibility, postural training, neuromuscular re-education, manual therapy, soft tissue mobilization, joint mobilization, home exercise program, stretching, strengthening, spinal/joint mobilization, balance/weight-bearing training, body mechanics training, therapeutic activities and transfer training  Frequency: 2x week  Duration in weeks: 12        Manual Therapy:    0     mins  45360;  Therapeutic Exercise:    0     mins  17010;     Neuromuscular Ryne:    0    mins  04946;    Therapeutic Activity:     0     mins  21835;     Gait Trainin     mins  92186;     Ultrasound:     0     mins  72515;    Electrical Stimulation:    0     mins  66098 ( );  Dry Needling     0     mins self-pay    Timed Treatment:   0   mins   Total Treatment:     31   mins    PT SIGNATURE: Stephanie Delgadillo, PT   DATE TREATMENT INITIATED: 3/18/2020    Initial Certification  Certification Period: 2020  I certify that the therapy services are furnished while this patient is under my care.  The services outlined above are required by this patient, and will be reviewed every 90  days.     PHYSICIAN: Nika Hoyos, DO      DATE:     Please sign and return via fax to [phone].. Thank you, [facility].

## 2020-07-17 ENCOUNTER — TRANSCRIBE ORDERS (OUTPATIENT)
Dept: ADMINISTRATIVE | Facility: HOSPITAL | Age: 60
End: 2020-07-17

## 2020-07-17 DIAGNOSIS — N28.89 RENAL MASS: Primary | ICD-10-CM

## 2020-07-20 ENCOUNTER — APPOINTMENT (OUTPATIENT)
Dept: CT IMAGING | Facility: HOSPITAL | Age: 60
End: 2020-07-20

## 2020-07-20 ENCOUNTER — TREATMENT (OUTPATIENT)
Dept: PHYSICAL THERAPY | Facility: CLINIC | Age: 60
End: 2020-07-20

## 2020-07-20 DIAGNOSIS — M25.562 LEFT KNEE PAIN, UNSPECIFIED CHRONICITY: ICD-10-CM

## 2020-07-20 DIAGNOSIS — R26.2 DIFFICULTY WALKING: ICD-10-CM

## 2020-07-20 DIAGNOSIS — M25.511 RIGHT SHOULDER PAIN, UNSPECIFIED CHRONICITY: ICD-10-CM

## 2020-07-20 DIAGNOSIS — M25.512 LEFT SHOULDER PAIN, UNSPECIFIED CHRONICITY: ICD-10-CM

## 2020-07-20 DIAGNOSIS — M54.50 MIDLINE LOW BACK PAIN WITHOUT SCIATICA, UNSPECIFIED CHRONICITY: Primary | ICD-10-CM

## 2020-07-20 DIAGNOSIS — M25.561 RIGHT KNEE PAIN, UNSPECIFIED CHRONICITY: ICD-10-CM

## 2020-07-20 PROCEDURE — 97162 PT EVAL MOD COMPLEX 30 MIN: CPT | Performed by: PHYSICAL THERAPIST

## 2020-07-20 PROCEDURE — 97530 THERAPEUTIC ACTIVITIES: CPT | Performed by: PHYSICAL THERAPIST

## 2020-07-20 NOTE — PROGRESS NOTES
Physical Therapy Initial Evaluation and Plan of Care    Patient: Larry Walters   : 1960  Diagnosis/ICD-10 Code:  Midline low back pain without sciatica, unspecified chronicity [M54.5]  Referring practitioner: Nika Hoyos DO  Date of Initial Visit: 2020  Today's Date: 2020  Patient seen for 1 sessions           Subjective Questionnaire: Oswestry: 68% disability at IE    Patient is a 60 year-old male reporting a history of pain but with a recent reexacerbation (see medical history below)of pain in low back (primarilty) but also in bilateral shoulders and knees, rates pain as  9/10 with activity and 0/10 when still, , impacts ability to come to stand, stand, walk, and find a position of comfort to sleep. Reports pain and mobility were exacerbated due to a fall in  (reports due to an obstacle/environmental factor) and then inability to attend on-site PT due to COVID-related closures 2020- currently, [facility] prior to that time was attending PT and able to walk without an assistive device. Patient's goal for therapy is to get strength back and improve steadiness with walking so that he can walk without a walker. [facility] is receiving orthopedic care for knee pain and to potentially to have knee surgery but advised of need to lose weight prior to surgical intervention. Currently receiving OT and speech therapies;  has receieved PT in the past for general mobility and to address pain with a favorable response per patient report.  Medical history reviewed with patient and includes extended immobility from  - (stroke and TBI 2018), R shoulder torn labrum and torn rotator cuffs B'ly, cardiac arrhythmia, tremors, cervical instrumented decompression 2016, lumbar epidurals for back pain (5+ years ago), R knee surgeries (s). see additional medical history in medical record. Medication list in chart.  Activity Level/Social History: Currently ambulates with 2  wheeled rolling walker for household distances, wheelchair for longer distances, unable to navigate stairs per patient and caregiver report, resides in single level home with girlfriend.  PLOF: Was receiving PT and progressed to ambulation without assistive device for household distances up until February of 2020 (then unable to attend onsite PT due to COVID-related facility closures).   Gen Obs: Accompanied to PT eval by girlfriend, reported height 6'0', weight 345 #  ROM: Shoulder flexion L to 120, R to 140, passive R knee flexion to 110, extension to -20, HS -30 w 90/90 test  MMT: shoulder flexion 5/5, abduction 5/5, elbow flexion and extension 5/5 B/ly, hip flexion 5/5, knee extension 5/5, knee flexion 5/5 L, 4/5 R, hip extension 4/5 B'ly, hip abduction 4/5 B'ly, ankle DF 4+/4, EHL 5/5.  Abdominal/core activation/strength: fair to poor in sitting and supine with overuse of respiratory diaphragm noted when attempting to isolate    Light touch sensation intact to B LE's,  General mobility: Requires 2 UE assistance with arms of chair to come to standing, ambulates with two wheeled rolling walker with significant cervical/thoracic and lumbar flexion decreased step length (R decreased > L), able to correct momentarily with cueing then returns to flexed posture, requires min A to roll supine to side and come to sitting from sidelying  Assessment: Patient presents for evaluation and treatment of pain and generalized decreased mobility/diffculty with walking with today's assessment revealing decreased transfer, strength, and walking tolerance deviations, all of which are impacting his mobility and overall general health (has been advised to lose weight in order to undergo recommended medical interventions). He needs to receive skilled physical therapy intervention to address these deficits and return him to his PLOF. Prognosis for PT is good given prior favorable response to physical therapy intervention. Goals and plan  were discussed with the patient with the patient expressing understanding and agreement with the proposed plan of care.      Short Term Goals (to be achieved in 30 days)  1) Outcome (VENKATESH) score < 55% disability to represent overall improved function  2) Independent in short term home exercise program   Long Term Goals (to be achieved within 90 days)   1) Outcome measure (VENKATESH) score >45%  to represent overall improved function  2) > good/ 4/5 muscle strength to improve pain, transfers/transitions, and walking  3) Patient to ambulate 250' with least restrictive assistive device and with minimal gait deviations  4) Patient independent in long term home exercise program to address general mobility and strength improvements     Plan: Patient will receive skilled physical therapy including manual therapy, neuromuscular reeducation, therapeutic exercise, therapeutic exercise, modalities as needed, patient education and home exercise program instruction.          Manual Therapy:         mins  14040;  Therapeutic Exercise:         mins  26485;     Neuromuscular Ryne:        mins  79197;    Therapeutic Activity:    10      mins  59687;     Gait Training:           mins  79296;     Ultrasound:          mins  83549;    Electrical Stimulation:         mins  42834 ( );  Dry Needling          mins self-pay    Timed Treatment: 10     mins   Total Treatment:   60     mins    PT SIGNATURE: SHUBHAM Mcginnis PT   DATE TREATMENT INITIATED: 7/20/2020    Initial Certification  Certification Period: 10/18/2020  I certify that the therapy services are furnished while this patient is under my care.  The services outlined above are required by this patient, and will be reviewed every 90 days.     PHYSICIAN: Nika Hoyos DO      DATE:     Please sign and return via fax to . Thank you, [facility].

## 2020-07-22 ENCOUNTER — TREATMENT (OUTPATIENT)
Dept: PHYSICAL THERAPY | Facility: CLINIC | Age: 60
End: 2020-07-22

## 2020-07-22 DIAGNOSIS — M54.50 MIDLINE LOW BACK PAIN WITHOUT SCIATICA, UNSPECIFIED CHRONICITY: Primary | ICD-10-CM

## 2020-07-22 DIAGNOSIS — R26.2 DIFFICULTY WALKING: ICD-10-CM

## 2020-07-22 DIAGNOSIS — M25.561 RIGHT KNEE PAIN, UNSPECIFIED CHRONICITY: ICD-10-CM

## 2020-07-22 PROCEDURE — 97110 THERAPEUTIC EXERCISES: CPT | Performed by: PHYSICAL THERAPIST

## 2020-07-22 PROCEDURE — 97112 NEUROMUSCULAR REEDUCATION: CPT | Performed by: PHYSICAL THERAPIST

## 2020-07-22 NOTE — PROGRESS NOTES
Physical Therapy Daily Progress Note        Subjective     Larry Walters reports: My biggest goal is to be more mobile and (I) with less LBP. ALso need TKA but need to lose weight    Objective   See Exercise, Manual, and Modality Logs for complete treatment.       Assessment/Plan  Had to stop ball up wall due to low back and ant hip soreness after 5 reps. Pt fatigued but stats pain rating 8/10 upon entering PT and 8/10 when leaving.    Progress per Plan of Care           Manual Therapy:  6       mins  47121;  Therapeutic Exercise: 25      mins  36105;     Neuromuscular Ryne:8      mins  68719;    Therapeutic Activity:         mins  49399;     Gait Training:         mins  50317;     Ultrasound:         mins  26397;    Electrical Stimulation:        mins  32321 ( );  Dry Needling         mins self-pay    Timed Treatment:   39   mins   Total Treatment:     39   mins    Subha Barraza, PT  Physical Therapist  [place] License # [id number]

## 2020-07-24 ENCOUNTER — HOSPITAL ENCOUNTER (OUTPATIENT)
Dept: CT IMAGING | Facility: HOSPITAL | Age: 60
Discharge: HOME | End: 2020-07-24
Admitting: UROLOGY

## 2020-07-24 ENCOUNTER — TELEPHONE (OUTPATIENT)
Dept: PHYSICAL THERAPY | Facility: CLINIC | Age: 60
End: 2020-07-24

## 2020-07-24 DIAGNOSIS — N28.89 RENAL MASS: ICD-10-CM

## 2020-07-24 PROCEDURE — 25010000002 IOPAMIDOL 61 % SOLUTION: Performed by: UROLOGY

## 2020-07-24 PROCEDURE — 82565 ASSAY OF CREATININE: CPT

## 2020-07-24 PROCEDURE — 74170 CT ABD WO CNTRST FLWD CNTRST: CPT

## 2020-07-24 PROCEDURE — 0 DIATRIZOATE MEGLUMINE & SODIUM PER 1 ML: Performed by: UROLOGY

## 2020-07-24 RX ADMIN — IOPAMIDOL 95 ML: 612 INJECTION, SOLUTION INTRAVENOUS at 13:00

## 2020-07-24 RX ADMIN — DIATRIZOATE MEGLUMINE AND DIATRIZOATE SODIUM 30 ML: 660; 100 LIQUID ORAL; RECTAL at 12:15

## 2020-07-25 LAB — CREAT BLDA-MCNC: 1 MG/DL (ref 0.6–1.3)

## 2020-07-27 ENCOUNTER — TREATMENT (OUTPATIENT)
Dept: PHYSICAL THERAPY | Facility: CLINIC | Age: 60
End: 2020-07-27

## 2020-07-27 DIAGNOSIS — M54.50 MIDLINE LOW BACK PAIN WITHOUT SCIATICA, UNSPECIFIED CHRONICITY: Primary | ICD-10-CM

## 2020-07-27 DIAGNOSIS — M25.562 LEFT KNEE PAIN, UNSPECIFIED CHRONICITY: ICD-10-CM

## 2020-07-27 DIAGNOSIS — M25.512 LEFT SHOULDER PAIN, UNSPECIFIED CHRONICITY: ICD-10-CM

## 2020-07-27 DIAGNOSIS — R26.2 DIFFICULTY WALKING: ICD-10-CM

## 2020-07-27 DIAGNOSIS — M25.511 RIGHT SHOULDER PAIN, UNSPECIFIED CHRONICITY: ICD-10-CM

## 2020-07-27 DIAGNOSIS — M25.561 RIGHT KNEE PAIN, UNSPECIFIED CHRONICITY: ICD-10-CM

## 2020-07-27 PROCEDURE — 97110 THERAPEUTIC EXERCISES: CPT | Performed by: PHYSICAL THERAPIST

## 2020-07-27 PROCEDURE — 97530 THERAPEUTIC ACTIVITIES: CPT | Performed by: PHYSICAL THERAPIST

## 2020-07-27 NOTE — PROGRESS NOTES
Physical Therapy Daily Progress Note      Visit # 3      Subjective   Pt reports no new changes. Rates pain 9/10 in lower back. States nothing in particular gives him relief.    Objective   See Exercise, Manual, and Modality Logs for complete treatment.       Assessment/Plan  Good tolerance to most exercise w/o c/o pain. Unable to perform ball roll up wall due to shoulder pain. Updated HEP to include alternating step back. Progress per POC.           Manual Therapy:    0     mins  03047;  Therapeutic Exercise:    30     mins  39563;     Neuromuscular Ryne:    0    mins  48494;    Therapeutic Activity:     20     mins  67791;     Gait Trainin     mins  93334;     Ultrasound:     0     mins  45912;    Work Hardening           0      mins 45002  Iontophoresis               0   mins 31383    Timed Treatment:   50   mins   Total Treatment:     60   mins    Tanna Dick, PT  Physical Therapist

## 2020-07-29 ENCOUNTER — TREATMENT (OUTPATIENT)
Dept: PHYSICAL THERAPY | Facility: CLINIC | Age: 60
End: 2020-07-29

## 2020-07-29 DIAGNOSIS — M25.512 LEFT SHOULDER PAIN, UNSPECIFIED CHRONICITY: ICD-10-CM

## 2020-07-29 DIAGNOSIS — M54.50 MIDLINE LOW BACK PAIN WITHOUT SCIATICA, UNSPECIFIED CHRONICITY: Primary | ICD-10-CM

## 2020-07-29 DIAGNOSIS — M25.561 RIGHT KNEE PAIN, UNSPECIFIED CHRONICITY: ICD-10-CM

## 2020-07-29 DIAGNOSIS — M25.511 RIGHT SHOULDER PAIN, UNSPECIFIED CHRONICITY: ICD-10-CM

## 2020-07-29 DIAGNOSIS — R26.2 DIFFICULTY WALKING: ICD-10-CM

## 2020-07-29 PROCEDURE — 97530 THERAPEUTIC ACTIVITIES: CPT | Performed by: PHYSICAL THERAPIST

## 2020-07-29 PROCEDURE — 97110 THERAPEUTIC EXERCISES: CPT | Performed by: PHYSICAL THERAPIST

## 2020-07-29 NOTE — PROGRESS NOTES
Physical Therapy Daily Progress Note      Visit # 4      Subjective   Pt reports mild improvement in back pain. Rates pain 8/10.    Objective   See Exercise, Manual, and Modality Logs for complete treatment.       Assessment/Plan  Good tolerance to exercise w/o c/o pain. Pt fatigued with ~10 mins standing activity, requiring seated rest break. Reported decreased pain overall post-session. Progress per POC.                 Manual Therapy:    0     mins  07674;  Therapeutic Exercise:    30     mins  02510;     Neuromuscular Ryne:    0    mins  63445;    Therapeutic Activity:     20     mins  02033;     Gait Trainin     mins  57296;     Ultrasound:     0     mins  59863;    Work Hardening           0      mins 54016  Iontophoresis               0   mins 39888    Timed Treatment:   50   mins   Total Treatment:     60   mins    Tanna Dick, PT  Physical Therapist

## 2020-08-03 ENCOUNTER — TREATMENT (OUTPATIENT)
Dept: PHYSICAL THERAPY | Facility: CLINIC | Age: 60
End: 2020-08-03

## 2020-08-03 DIAGNOSIS — M54.50 MIDLINE LOW BACK PAIN WITHOUT SCIATICA, UNSPECIFIED CHRONICITY: Primary | ICD-10-CM

## 2020-08-03 DIAGNOSIS — R26.2 DIFFICULTY WALKING: ICD-10-CM

## 2020-08-03 DIAGNOSIS — M25.561 RIGHT KNEE PAIN, UNSPECIFIED CHRONICITY: ICD-10-CM

## 2020-08-03 PROCEDURE — 97112 NEUROMUSCULAR REEDUCATION: CPT | Performed by: PHYSICAL THERAPIST

## 2020-08-03 NOTE — PROGRESS NOTES
Physical Therapy Daily Progress Note      Larry Walters reports: he is feeling okay. Shoulders are bothering him the most today(stiffness)    Subjective     Objective   - ambulated in, around,out of clinic with rolling walker.  Ambulated with fwd flexed posture/positioning as well as decreased step/stride length.    See Exercise, Manual, and Modality Logs for complete treatment.   -verbal/tactile cues for proper posture of trunk with ambulation   -performed 14 consecutive minutes of standing/weightbearing activity in one measurement(before needing to sit and rest)  Added:  Lateral step ups to 2 inch stepper platform height;    Assessment/Plan Demonstrates improved endurance with weightbearing/balance activities this session(able to perform 14 minutes consecutive vs 10 mins prior session). Continues to require multiple verbal/tactile cues for proper posture with gait/ambulation with rolling walker as well as TA contraction with balance/walking activities.  Should continue to improve with continued PT efforts.     Progress strengthening /stabilization /functional activity.  Consider adding scap retraction saws with tband next session.           Manual Therapy:         mins  23834;  Therapeutic Exercise:         mins  49937;     Neuromuscular Ryne:   24     mins  86155;    Therapeutic Activity:     5     mins  35153;     Gait Training:           mins  49439;     Ultrasound:          mins  60475;    Electrical Stimulation:         mins  05220 ( );      Timed Treatment:  29    mins   Total Treatment:    29    mins    Joao Herbert PTA    Physical Therapist Assistant [place] [id number]

## 2020-08-05 ENCOUNTER — TREATMENT (OUTPATIENT)
Dept: PHYSICAL THERAPY | Facility: CLINIC | Age: 60
End: 2020-08-05

## 2020-08-05 DIAGNOSIS — M54.50 MIDLINE LOW BACK PAIN WITHOUT SCIATICA, UNSPECIFIED CHRONICITY: Primary | ICD-10-CM

## 2020-08-05 DIAGNOSIS — M25.561 RIGHT KNEE PAIN, UNSPECIFIED CHRONICITY: ICD-10-CM

## 2020-08-05 DIAGNOSIS — M25.511 RIGHT SHOULDER PAIN, UNSPECIFIED CHRONICITY: ICD-10-CM

## 2020-08-05 DIAGNOSIS — R26.2 DIFFICULTY WALKING: ICD-10-CM

## 2020-08-05 DIAGNOSIS — M25.562 LEFT KNEE PAIN, UNSPECIFIED CHRONICITY: ICD-10-CM

## 2020-08-05 DIAGNOSIS — M25.512 LEFT SHOULDER PAIN, UNSPECIFIED CHRONICITY: ICD-10-CM

## 2020-08-05 PROCEDURE — 97530 THERAPEUTIC ACTIVITIES: CPT | Performed by: PHYSICAL THERAPIST

## 2020-08-05 PROCEDURE — 97110 THERAPEUTIC EXERCISES: CPT | Performed by: PHYSICAL THERAPIST

## 2020-08-05 NOTE — PROGRESS NOTES
Physical Therapy Daily Progress Note      Visit # 6      Subjective   Shoulders and lower back are painful. Rates pain 9/10 w/ walking and reaching.    Objective   See Exercise, Manual, and Modality Logs for complete treatment.       Assessment/Plan  Good tolerance to new exercise targeting shoulder, upper back, and lower back strengthening w/o c/o pain. Updated HEP to include single arm banded extension, row, and press. Progress per POC.         Manual Therapy:    0     mins  16828;  Therapeutic Exercise:    15     mins  18247;     Neuromuscular Ryne:    0    mins  73953;    Therapeutic Activity:     25     mins  53811;     Gait Trainin     mins  69848;     Ultrasound:     0     mins  35333;    Work Hardening           0      mins 05574  Iontophoresis               0   mins 47330    Timed Treatment:   40   mins   Total Treatment:     55   mins    Tanna Dick, PT  Physical Therapist

## 2020-08-07 ENCOUNTER — TREATMENT (OUTPATIENT)
Dept: PHYSICAL THERAPY | Facility: CLINIC | Age: 60
End: 2020-08-07

## 2020-08-07 DIAGNOSIS — M25.562 LEFT KNEE PAIN, UNSPECIFIED CHRONICITY: ICD-10-CM

## 2020-08-07 DIAGNOSIS — R26.2 DIFFICULTY WALKING: ICD-10-CM

## 2020-08-07 DIAGNOSIS — M25.511 RIGHT SHOULDER PAIN, UNSPECIFIED CHRONICITY: ICD-10-CM

## 2020-08-07 DIAGNOSIS — M54.50 MIDLINE LOW BACK PAIN WITHOUT SCIATICA, UNSPECIFIED CHRONICITY: Primary | ICD-10-CM

## 2020-08-07 DIAGNOSIS — M25.561 RIGHT KNEE PAIN, UNSPECIFIED CHRONICITY: ICD-10-CM

## 2020-08-07 DIAGNOSIS — M25.512 LEFT SHOULDER PAIN, UNSPECIFIED CHRONICITY: ICD-10-CM

## 2020-08-07 PROCEDURE — 97530 THERAPEUTIC ACTIVITIES: CPT | Performed by: PHYSICAL THERAPIST

## 2020-08-07 PROCEDURE — 97140 MANUAL THERAPY 1/> REGIONS: CPT | Performed by: PHYSICAL THERAPIST

## 2020-08-07 NOTE — PROGRESS NOTES
Physical Therapy Daily Progress Note      Visit # 7      Subjective   Pt rates pain 9/10 in shoulders and lower back.    Objective   See Exercise, Manual, and Modality Logs for complete treatment.       Assessment/Plan  Pain rating remains very high, 9/10. Good tolerance to exercise w/ 1 c/o low back pain after 3+ minutes on balance board. Pt reported some relief of low back and shoulder pain with manual traction and PROM. Progress per POC.         Manual Therapy:    8     mins  51560;  Therapeutic Exercise:    5     mins  63701;     Neuromuscular Ryne:    0    mins  41513;    Therapeutic Activity:     25     mins  51961;     Gait Trainin     mins  99130;     Ultrasound:     0     mins  83497;    Work Hardening           0      mins 42694  Iontophoresis               0   mins 02680    Timed Treatment:   38   mins   Total Treatment:     48   mins    Tanna Dick, PT  Physical Therapist

## 2020-08-10 ENCOUNTER — TREATMENT (OUTPATIENT)
Dept: PHYSICAL THERAPY | Facility: CLINIC | Age: 60
End: 2020-08-10

## 2020-08-10 DIAGNOSIS — M25.562 LEFT KNEE PAIN, UNSPECIFIED CHRONICITY: ICD-10-CM

## 2020-08-10 DIAGNOSIS — M25.511 RIGHT SHOULDER PAIN, UNSPECIFIED CHRONICITY: ICD-10-CM

## 2020-08-10 DIAGNOSIS — M25.561 RIGHT KNEE PAIN, UNSPECIFIED CHRONICITY: ICD-10-CM

## 2020-08-10 DIAGNOSIS — R26.2 DIFFICULTY WALKING: ICD-10-CM

## 2020-08-10 DIAGNOSIS — M25.512 LEFT SHOULDER PAIN, UNSPECIFIED CHRONICITY: ICD-10-CM

## 2020-08-10 DIAGNOSIS — M54.50 MIDLINE LOW BACK PAIN WITHOUT SCIATICA, UNSPECIFIED CHRONICITY: Primary | ICD-10-CM

## 2020-08-10 PROCEDURE — 97140 MANUAL THERAPY 1/> REGIONS: CPT | Performed by: PHYSICAL THERAPIST

## 2020-08-10 PROCEDURE — 97110 THERAPEUTIC EXERCISES: CPT | Performed by: PHYSICAL THERAPIST

## 2020-08-10 NOTE — PROGRESS NOTES
Physical Therapy Daily Progress Note      Visit # 8      Subjective   Pt reports some pain relief with new manual interventions introduced last week. States relief carried on through the weekend. Rates pain 8/10 today.    Objective   See Exercise, Manual, and Modality Logs for complete treatment.       Assessment/Plan  Some pain relief with manual lumbar traction and shoulder stretching, though, pain rating remains high. Was able to complete ~45 minutes of exercise with seated rest breaks throughout.         Manual Therapy:    10     mins  05542;  Therapeutic Exercise:    35     mins  94467;     Neuromuscular Ryne:    0    mins  29830;    Therapeutic Activity:     0     mins  13682;  10 mins w/ PT tech   Gait Trainin     mins  98864;     Ultrasound:     0     mins  43514;    Work Hardening           0      mins 44894  Iontophoresis               0   mins 73221    Timed Treatment:   45   mins   Total Treatment:     60   mins    Tanna Dick PT  Physical Therapist

## 2020-08-12 ENCOUNTER — TREATMENT (OUTPATIENT)
Dept: PHYSICAL THERAPY | Facility: CLINIC | Age: 60
End: 2020-08-12

## 2020-08-12 DIAGNOSIS — M25.512 LEFT SHOULDER PAIN, UNSPECIFIED CHRONICITY: ICD-10-CM

## 2020-08-12 DIAGNOSIS — M25.562 LEFT KNEE PAIN, UNSPECIFIED CHRONICITY: ICD-10-CM

## 2020-08-12 DIAGNOSIS — M25.511 RIGHT SHOULDER PAIN, UNSPECIFIED CHRONICITY: ICD-10-CM

## 2020-08-12 DIAGNOSIS — M54.50 MIDLINE LOW BACK PAIN WITHOUT SCIATICA, UNSPECIFIED CHRONICITY: Primary | ICD-10-CM

## 2020-08-12 DIAGNOSIS — R26.2 DIFFICULTY WALKING: ICD-10-CM

## 2020-08-12 DIAGNOSIS — M25.561 RIGHT KNEE PAIN, UNSPECIFIED CHRONICITY: ICD-10-CM

## 2020-08-12 PROCEDURE — 97110 THERAPEUTIC EXERCISES: CPT | Performed by: PHYSICAL THERAPIST

## 2020-08-12 PROCEDURE — 97530 THERAPEUTIC ACTIVITIES: CPT | Performed by: PHYSICAL THERAPIST

## 2020-08-12 PROCEDURE — 97140 MANUAL THERAPY 1/> REGIONS: CPT | Performed by: PHYSICAL THERAPIST

## 2020-08-12 NOTE — PROGRESS NOTES
Physical Therapy Daily Progress Note      Visit # 9      Subjective   Pt reports hands on lumbar traction and shoulder stretching is helpful.     Objective   See Exercise, Manual, and Modality Logs for complete treatment.       Assessment/Plan  Able to ambulate 30-40 yards at a time with cane in clinic. Distance limited by fatigue and low back pain. Able to ascend/descend 6 inch stairs w/ use of 2 railings and step to pattern. Continues to report pain relief with manual lumbar traction and shoulder stetching. Progress per POC.         Manual Therapy:    15     mins  91319;  Therapeutic Exercise:    15     mins  38842;     Neuromuscular Ryne:    0    mins  14773;    Therapeutic Activity:     15     mins  47161;     Gait Trainin     mins  56263;     Ultrasound:     0     mins  18234;    Work Hardening           0      mins 47763  Iontophoresis               0   mins 98664    Timed Treatment:   45   mins   Total Treatment:     60   mins    Tanna Dick, PT  Physical Therapist

## 2020-08-14 ENCOUNTER — TREATMENT (OUTPATIENT)
Dept: PHYSICAL THERAPY | Facility: CLINIC | Age: 60
End: 2020-08-14

## 2020-08-14 DIAGNOSIS — M25.511 RIGHT SHOULDER PAIN, UNSPECIFIED CHRONICITY: ICD-10-CM

## 2020-08-14 DIAGNOSIS — M25.562 LEFT KNEE PAIN, UNSPECIFIED CHRONICITY: ICD-10-CM

## 2020-08-14 DIAGNOSIS — M25.512 LEFT SHOULDER PAIN, UNSPECIFIED CHRONICITY: ICD-10-CM

## 2020-08-14 DIAGNOSIS — R26.2 DIFFICULTY WALKING: ICD-10-CM

## 2020-08-14 DIAGNOSIS — M54.50 MIDLINE LOW BACK PAIN WITHOUT SCIATICA, UNSPECIFIED CHRONICITY: Primary | ICD-10-CM

## 2020-08-14 DIAGNOSIS — M25.561 RIGHT KNEE PAIN, UNSPECIFIED CHRONICITY: ICD-10-CM

## 2020-08-14 PROCEDURE — 97530 THERAPEUTIC ACTIVITIES: CPT | Performed by: PHYSICAL THERAPIST

## 2020-08-14 PROCEDURE — 97140 MANUAL THERAPY 1/> REGIONS: CPT | Performed by: PHYSICAL THERAPIST

## 2020-08-14 PROCEDURE — 97116 GAIT TRAINING THERAPY: CPT | Performed by: PHYSICAL THERAPIST

## 2020-08-14 NOTE — PROGRESS NOTES
Re-Assessment / Re-Certification        Patient: Larry Walters   : 1960  Diagnosis/ICD-10 Code:  Midline low back pain without sciatica, unspecified chronicity [M54.5]  Referring practitioner: Nati Dangelo DO  Date of Initial Evaluation:  Type: THERAPY  Noted: 2020  Patient seen for 10 sessions      Subjective:   Larry Walters reports: Rates R knee pain 7/10. Rates pain in shoulders and back 9/10. Using cane today as opposed to walker.  Subjective Questionnaire: Oswestry: 32% vs 68% at IE 20  Clinical Progress: improved  Home Program Compliance: Yes  Treatment has included: therapeutic exercise, manual therapy, therapeutic activity and gait training    Subjective   Objective          Strength/Myotome Testing     Left Hip   Planes of Motion   Flexion: 5    Right Hip   Planes of Motion   Flexion: 5    Left Knee   Flexion: 5  Extension: 5    Right Knee   Flexion: 5  Extension: 5    Left Ankle/Foot   Dorsiflexion: 5    Right Ankle/Foot   Dorsiflexion: 5      5x STS: 30.5  TU.4    Short Term Goals (to be achieved in 30 days, 20)  1) Outcome (VENKATESH) score < 55% disability to represent overall improved function MET  2) Independent in short term home exercise program MET  Long Term Goals (to be achieved within 90 days)   1) Outcome measure (VENKATESH) score >45%  to represent overall improved function MET  2) > good/ 4/5 muscle strength to improve pain, transfers/transitions, and walking MET  3) Patient to ambulate 250' with least restrictive assistive device and with minimal gait deviations NOT MET  4) Patient independent in long term home exercise program to address general mobility and strength improvements    Assessment/Plan  Progress toward previous goals: Partially Met    Continues to have very high pain rating in regards to shoulders, back, and R knee. Good tolerance to physical activity, but requires breaks approximately every 10 minutes due to fatigue and low back pain. Able to progress to ambulation  with cane in the past week. Will continue to benefit from skilled PT to be able to ambulate community distance with cane independently and decrease pain rating w/ ADLs.          Recommendations: Continue as planned  Timeframe: 6 weeks  Prognosis to achieve goals: good    PT Signature: Tanna Dick PT      Based upon review of the patient's progress and continued therapy plan, it is my medical opinion that Larry Walters should continue physical therapy treatment at [facility]  [address] [place] [id number] [phone].    Signature: __________________________________  Nati Dangelo,     Manual Therapy:    10     mins  26008;  Therapeutic Exercise:    0     mins  05392;     Neuromuscular Ryne:    0    mins  02888;    Therapeutic Activity:     20     mins  03867;     Gait Training:      10     mins  44955;     Ultrasound:     0     mins  81508;    Work Hardening           0      mins 95451  Iontophoresis               0   mins 36178    Timed Treatment:   40   mins   Total Treatment:     60   mins

## 2020-08-20 ENCOUNTER — TREATMENT (OUTPATIENT)
Dept: PHYSICAL THERAPY | Facility: CLINIC | Age: 60
End: 2020-08-20

## 2020-08-20 DIAGNOSIS — M25.562 LEFT KNEE PAIN, UNSPECIFIED CHRONICITY: ICD-10-CM

## 2020-08-20 DIAGNOSIS — R26.2 DIFFICULTY WALKING: ICD-10-CM

## 2020-08-20 DIAGNOSIS — M25.511 RIGHT SHOULDER PAIN, UNSPECIFIED CHRONICITY: ICD-10-CM

## 2020-08-20 DIAGNOSIS — M54.50 MIDLINE LOW BACK PAIN WITHOUT SCIATICA, UNSPECIFIED CHRONICITY: Primary | ICD-10-CM

## 2020-08-20 DIAGNOSIS — M25.512 LEFT SHOULDER PAIN, UNSPECIFIED CHRONICITY: ICD-10-CM

## 2020-08-20 DIAGNOSIS — M25.561 RIGHT KNEE PAIN, UNSPECIFIED CHRONICITY: ICD-10-CM

## 2020-08-20 PROCEDURE — 97530 THERAPEUTIC ACTIVITIES: CPT | Performed by: PHYSICAL THERAPIST

## 2020-08-20 PROCEDURE — 97116 GAIT TRAINING THERAPY: CPT | Performed by: PHYSICAL THERAPIST

## 2020-08-20 PROCEDURE — 97140 MANUAL THERAPY 1/> REGIONS: CPT | Performed by: PHYSICAL THERAPIST

## 2020-08-20 NOTE — PROGRESS NOTES
Physical Therapy Daily Progress Note      Visit # 11      Subjective   Pt reports no new changes. Rates pain 9/10 in superior shoulders, lower back, and R knee.    Objective   See Exercise, Manual, and Modality Logs for complete treatment.       Assessment/Plan  Reports pain relief but no significant decrease in pain rating with manual interventions. Required repeated verbal cuing for reciprocal step pattern over 4 inch hurdles in parallel bars. Significantly decreased gait speed when attempting reciprocal step pattern with cane. Continues to require seated rest breaks after walking ~30 yards. Progress per POC.           Manual Therapy:    15     mins  30359;  Therapeutic Exercise:    0     mins  37065;     Neuromuscular Ryne:    0    mins  02328;    Therapeutic Activity:     15     mins  26823;     Gait Training:      15     mins  39954;     Ultrasound:     0     mins  97882;    Work Hardening           0      mins 36473  Iontophoresis               0   mins 35770    Timed Treatment:   45   mins   Total Treatment:     60   mins    Tanna Dick, PT  Physical Therapist

## 2020-08-26 ENCOUNTER — TREATMENT (OUTPATIENT)
Dept: PHYSICAL THERAPY | Facility: CLINIC | Age: 60
End: 2020-08-26

## 2020-08-26 DIAGNOSIS — M25.562 LEFT KNEE PAIN, UNSPECIFIED CHRONICITY: ICD-10-CM

## 2020-08-26 DIAGNOSIS — R26.2 DIFFICULTY WALKING: ICD-10-CM

## 2020-08-26 DIAGNOSIS — M25.511 RIGHT SHOULDER PAIN, UNSPECIFIED CHRONICITY: ICD-10-CM

## 2020-08-26 DIAGNOSIS — M25.561 RIGHT KNEE PAIN, UNSPECIFIED CHRONICITY: ICD-10-CM

## 2020-08-26 DIAGNOSIS — M54.50 MIDLINE LOW BACK PAIN WITHOUT SCIATICA, UNSPECIFIED CHRONICITY: Primary | ICD-10-CM

## 2020-08-26 DIAGNOSIS — M25.512 LEFT SHOULDER PAIN, UNSPECIFIED CHRONICITY: ICD-10-CM

## 2020-08-26 PROCEDURE — 97140 MANUAL THERAPY 1/> REGIONS: CPT | Performed by: PHYSICAL THERAPIST

## 2020-08-26 PROCEDURE — 97530 THERAPEUTIC ACTIVITIES: CPT | Performed by: PHYSICAL THERAPIST

## 2020-08-31 ENCOUNTER — TREATMENT (OUTPATIENT)
Dept: PHYSICAL THERAPY | Facility: CLINIC | Age: 60
End: 2020-08-31

## 2020-08-31 DIAGNOSIS — M25.561 RIGHT KNEE PAIN, UNSPECIFIED CHRONICITY: ICD-10-CM

## 2020-08-31 DIAGNOSIS — M25.562 LEFT KNEE PAIN, UNSPECIFIED CHRONICITY: ICD-10-CM

## 2020-08-31 DIAGNOSIS — R26.2 DIFFICULTY WALKING: ICD-10-CM

## 2020-08-31 DIAGNOSIS — M54.50 MIDLINE LOW BACK PAIN WITHOUT SCIATICA, UNSPECIFIED CHRONICITY: Primary | ICD-10-CM

## 2020-08-31 PROCEDURE — 97140 MANUAL THERAPY 1/> REGIONS: CPT | Performed by: PHYSICAL THERAPIST

## 2020-08-31 PROCEDURE — 97116 GAIT TRAINING THERAPY: CPT | Performed by: PHYSICAL THERAPIST

## 2020-08-31 PROCEDURE — 97112 NEUROMUSCULAR REEDUCATION: CPT | Performed by: PHYSICAL THERAPIST

## 2020-09-03 ENCOUNTER — TREATMENT (OUTPATIENT)
Dept: PHYSICAL THERAPY | Facility: CLINIC | Age: 60
End: 2020-09-03

## 2020-09-03 DIAGNOSIS — M25.511 RIGHT SHOULDER PAIN, UNSPECIFIED CHRONICITY: ICD-10-CM

## 2020-09-03 DIAGNOSIS — M54.50 MIDLINE LOW BACK PAIN WITHOUT SCIATICA, UNSPECIFIED CHRONICITY: Primary | ICD-10-CM

## 2020-09-03 DIAGNOSIS — R26.2 DIFFICULTY WALKING: ICD-10-CM

## 2020-09-03 DIAGNOSIS — M25.562 LEFT KNEE PAIN, UNSPECIFIED CHRONICITY: ICD-10-CM

## 2020-09-03 DIAGNOSIS — M25.561 RIGHT KNEE PAIN, UNSPECIFIED CHRONICITY: ICD-10-CM

## 2020-09-03 DIAGNOSIS — M25.512 LEFT SHOULDER PAIN, UNSPECIFIED CHRONICITY: ICD-10-CM

## 2020-09-03 PROCEDURE — 97140 MANUAL THERAPY 1/> REGIONS: CPT | Performed by: PHYSICAL THERAPIST

## 2020-09-03 PROCEDURE — 97116 GAIT TRAINING THERAPY: CPT | Performed by: PHYSICAL THERAPIST

## 2020-09-03 PROCEDURE — 97530 THERAPEUTIC ACTIVITIES: CPT | Performed by: PHYSICAL THERAPIST

## 2020-09-03 NOTE — PROGRESS NOTES
Physical Therapy Daily Progress Note      Visit # 14      Subjective   Pt reports he had a consult for knee surgery and will try to lose 40lbs in order to have TKA. Stretching at therapy seems to help lower back and shoulder pain.    Objective   See Exercise, Manual, and Modality Logs for complete treatment.     Assessment/Plan  Reports pain relief w/ lumbar traction and shoulder stretching. Was able to step over hurdles with one handrail, demonstrating improved balance. Performed sit to stand x5 w/o arms, demonstrating improved strength. Continues to require seated rest break after each activity. Progress per POC.           Manual Therapy:    10     mins  90095;  Therapeutic Exercise:    0     mins  72529;     Neuromuscular Ryne:    0    mins  63556;    Therapeutic Activity:     15     mins  88268;     Gait Training:      15     mins  62197;     Ultrasound:     0     mins  96079;    Work Hardening           0      mins 84595  Iontophoresis               0   mins 92524    Timed Treatment:   45   mins   Total Treatment:     55   mins    Tanna Dick, PT  Physical Therapist

## 2020-09-11 ENCOUNTER — TREATMENT (OUTPATIENT)
Dept: PHYSICAL THERAPY | Facility: CLINIC | Age: 60
End: 2020-09-11

## 2020-09-11 DIAGNOSIS — M54.50 MIDLINE LOW BACK PAIN WITHOUT SCIATICA, UNSPECIFIED CHRONICITY: Primary | ICD-10-CM

## 2020-09-11 DIAGNOSIS — M25.511 RIGHT SHOULDER PAIN, UNSPECIFIED CHRONICITY: ICD-10-CM

## 2020-09-11 DIAGNOSIS — M25.562 LEFT KNEE PAIN, UNSPECIFIED CHRONICITY: ICD-10-CM

## 2020-09-11 DIAGNOSIS — R26.2 DIFFICULTY WALKING: ICD-10-CM

## 2020-09-11 DIAGNOSIS — M25.512 LEFT SHOULDER PAIN, UNSPECIFIED CHRONICITY: ICD-10-CM

## 2020-09-11 DIAGNOSIS — M25.561 RIGHT KNEE PAIN, UNSPECIFIED CHRONICITY: ICD-10-CM

## 2020-09-11 PROCEDURE — 97530 THERAPEUTIC ACTIVITIES: CPT | Performed by: PHYSICAL THERAPIST

## 2020-09-11 PROCEDURE — 97110 THERAPEUTIC EXERCISES: CPT | Performed by: PHYSICAL THERAPIST

## 2020-09-11 NOTE — PROGRESS NOTES
Physical Therapy Daily Progress Note      Larry Walters reports: low back pain still present and limiting with prolonged standing/walking activities.    Subjective     Objective   See Exercise, Manual, and Modality Logs for complete treatment.   Added:  TRX walkouts into abd Y LAQ, seated hs curls with blue t band. Leg press B LE's    Assessment/Plan  Feels like left knee and shoulders have improved the most with therapy.  LBP most prominent when standing per subjective reports.  Objectively he presents with increased steadiness with gait without noted LOB, though still ambulates with altered trunk positioning(fwd flexed) and requires assistive device(straight cane/rollator) use for long distance ambulation.  Able to progress LE exercises to include isotonic/resistive bands without increased knee pain.  Improved tolerance/endurance to exercise with noted lessened rest breaks between activities due to fatigue.      Other Recert next visit due to time.  Check goals, ROM, etc.  Update Outcome scales           Manual Therapy:         mins  62210;  Therapeutic Exercise:    32     mins  21798;     Neuromuscular Ryne:    4    mins  16613;    Therapeutic Activity:    10      mins  29083;     Gait Training:           mins  34754;     Ultrasound:          mins  47954;    Electrical Stimulation:         mins  75511 ( );      Timed Treatment:  46    mins   Total Treatment:    46    mins    Joao Herbert PTA    Physical Therapist Assistant [place] [address]

## 2020-09-14 ENCOUNTER — TREATMENT (OUTPATIENT)
Dept: PHYSICAL THERAPY | Facility: CLINIC | Age: 60
End: 2020-09-14

## 2020-09-14 DIAGNOSIS — M25.561 RIGHT KNEE PAIN, UNSPECIFIED CHRONICITY: ICD-10-CM

## 2020-09-14 DIAGNOSIS — M25.511 RIGHT SHOULDER PAIN, UNSPECIFIED CHRONICITY: ICD-10-CM

## 2020-09-14 DIAGNOSIS — R26.2 DIFFICULTY WALKING: ICD-10-CM

## 2020-09-14 DIAGNOSIS — M25.562 LEFT KNEE PAIN, UNSPECIFIED CHRONICITY: ICD-10-CM

## 2020-09-14 DIAGNOSIS — M54.50 MIDLINE LOW BACK PAIN WITHOUT SCIATICA, UNSPECIFIED CHRONICITY: Primary | ICD-10-CM

## 2020-09-14 DIAGNOSIS — M25.512 LEFT SHOULDER PAIN, UNSPECIFIED CHRONICITY: ICD-10-CM

## 2020-09-14 PROCEDURE — 97530 THERAPEUTIC ACTIVITIES: CPT | Performed by: PHYSICAL THERAPIST

## 2020-09-14 PROCEDURE — 97140 MANUAL THERAPY 1/> REGIONS: CPT | Performed by: PHYSICAL THERAPIST

## 2020-09-14 PROCEDURE — 97110 THERAPEUTIC EXERCISES: CPT | Performed by: PHYSICAL THERAPIST

## 2020-09-14 NOTE — PROGRESS NOTES
Re-Assessment / Re-Certification        Patient: Larry Walters   : 1960  Diagnosis/ICD-10 Code:  Midline low back pain without sciatica, unspecified chronicity [M54.5]  Referring practitioner: Nati Dangelo DO  Date of Initial Evaluation:  Type: THERAPY  Noted: 2020  Patient seen for 16 sessions      Subjective:   Larry Walters reports: Feeling pretty good. Shoulders are sore when reaching, for example grabbing clipboard for speech therapy homework. I don't feel like I have the balance that I need to have. No falls. Uses walker going to bed and getting up in the AM. Using cane at all other times. Rates pain 8/10 at its worst. I can be sitting or moving and be in 8/10 pain. Right now I have no pain.  Subjective Questionnaire: Oswestry: 42% vs 32% 20  Clinical Progress: unchanged  Home Program Compliance: Yes  Treatment has included: therapeutic exercise, manual therapy, therapeutic activity and gait training    Subjective   Objective          Active Range of Motion   Left Shoulder   Flexion: 120 degrees   Abduction: 140 degrees     Right Shoulder   Flexion: 120 degrees   Abduction: 124 degrees     Strength/Myotome Testing     Left Shoulder     Planes of Motion   Flexion: 5   Abduction: 5   External rotation at 0°: 5   Internal rotation at 0°: 5     Right Shoulder     Planes of Motion   Flexion: 5   Abduction: 5   External rotation at 0°: 5   Internal rotation at 0°: 5     Left Elbow   Flexion: 5  Extension: 5    Right Elbow   Flexion: 5  Extension: 5    Left Hip   Planes of Motion   Flexion: 5    Right Hip   Planes of Motion   Flexion: 5    Left Knee   Flexion: 5  Extension: 4+    Right Knee   Flexion: 5  Extension: 4+    Left Ankle/Foot   Dorsiflexion: 5    Right Ankle/Foot   Dorsiflexion: 5        5x STS: Trial 1: 41s Trial 2: 45s (no UE assist for both trials)  TUs (Quad cane)    Short Term Goals (Esatblished 20)  1) Outcome (VENKATESH) score < 55% disability to represent overall improved function  MET  2) Independent in short term home exercise program MET  Long Term Goals (to be achieved within 90 days)   1) Outcome measure (VENKATESH) score >45%  to represent overall improved function MET  2) > good/ 4/5 muscle strength to improve pain, transfers/transitions, and walking MET  3) Patient to ambulate 250' with least restrictive assistive device and with minimal gait deviations NOT MET  4) Patient independent in long term home exercise program to address general mobility and strength improvements PARTIALLY MET     Assessment/Plan  Progress toward previous goals: Partially Met    5x STS worsened since previous assessment (30.5s 20). TUG unchanged. No longer reports constant high pain rating, however, pain still fluctuates as high as 8/10. Responds well to manual lumbar traction and shoulder stretching. Tolerates exercise well. Progress per POC.        Recommendations: Continue with recommendations 2x/week for 6 weeks  Timeframe: 6 weeks  Prognosis to achieve goals: fair    PT Signature: Tanna Dick PT      Based upon review of the patient's progress and continued therapy plan, it is my medical opinion that Larry Walters should continue physical therapy treatment at [facility] PHYSICAL THERAPY  [address] [place] [id number] [phone].    Signature: __________________________________  Nati Dangelo,     Manual Therapy:    10     mins  39821;  Therapeutic Exercise:    15     mins  52283;     Neuromuscular Ryne:    0    mins  39150;    Therapeutic Activity:     15     mins  89838;     Gait Trainin     mins  09931;     Ultrasound:     0     mins  53198;    Work Hardening           0      mins 78260  Iontophoresis               0   mins 97798    Timed Treatment:   40   mins   Total Treatment:     55   mins

## 2020-09-25 ENCOUNTER — TREATMENT (OUTPATIENT)
Dept: PHYSICAL THERAPY | Facility: CLINIC | Age: 60
End: 2020-09-25

## 2020-09-25 DIAGNOSIS — M54.50 MIDLINE LOW BACK PAIN WITHOUT SCIATICA, UNSPECIFIED CHRONICITY: Primary | ICD-10-CM

## 2020-09-25 DIAGNOSIS — M25.561 RIGHT KNEE PAIN, UNSPECIFIED CHRONICITY: ICD-10-CM

## 2020-09-25 DIAGNOSIS — M25.562 LEFT KNEE PAIN, UNSPECIFIED CHRONICITY: ICD-10-CM

## 2020-09-25 DIAGNOSIS — M25.512 LEFT SHOULDER PAIN, UNSPECIFIED CHRONICITY: ICD-10-CM

## 2020-09-25 DIAGNOSIS — M25.511 RIGHT SHOULDER PAIN, UNSPECIFIED CHRONICITY: ICD-10-CM

## 2020-09-25 DIAGNOSIS — R26.2 DIFFICULTY WALKING: ICD-10-CM

## 2020-09-25 PROCEDURE — 97140 MANUAL THERAPY 1/> REGIONS: CPT | Performed by: PHYSICAL THERAPIST

## 2020-09-25 PROCEDURE — 97530 THERAPEUTIC ACTIVITIES: CPT | Performed by: PHYSICAL THERAPIST

## 2020-09-25 NOTE — PROGRESS NOTES
Physical Therapy Daily Progress Note      Visit # 17      Subjective    Pt reports (anterior) hips and R shoulder hurt. Rates pain 5/10.    Objective   See Exercise, Manual, and Modality Logs for complete treatment.       Assessment/Plan  Continues to have limited R shoulder PROM but tolerates manual stretching well. Endurance remains limited as pt needed seated rest break after 1.5 mins of stair toe taps. Taught pt new hip flexor stretch and issued new print out for HEP. Progress per POC.                 Manual Therapy:    15     mins  55023;  Therapeutic Exercise:    0     mins  01317;     Neuromuscular Ryne:    0    mins  37401;    Therapeutic Activity:     25     mins  48735;     Gait Trainin     mins  10186;     Ultrasound:     0     mins  92418;    Work Hardening           0      mins 26430  Iontophoresis               0   mins 58204    Timed Treatment:   40   mins   Total Treatment:     50   mins    Tanna Dick, PT  Physical Therapist

## 2020-10-13 ENCOUNTER — TREATMENT (OUTPATIENT)
Dept: PHYSICAL THERAPY | Facility: CLINIC | Age: 60
End: 2020-10-13

## 2020-10-13 DIAGNOSIS — M25.562 LEFT KNEE PAIN, UNSPECIFIED CHRONICITY: ICD-10-CM

## 2020-10-13 DIAGNOSIS — M54.50 MIDLINE LOW BACK PAIN WITHOUT SCIATICA, UNSPECIFIED CHRONICITY: Primary | ICD-10-CM

## 2020-10-13 DIAGNOSIS — R26.2 DIFFICULTY WALKING: ICD-10-CM

## 2020-10-13 DIAGNOSIS — M25.511 RIGHT SHOULDER PAIN, UNSPECIFIED CHRONICITY: ICD-10-CM

## 2020-10-13 DIAGNOSIS — M25.512 LEFT SHOULDER PAIN, UNSPECIFIED CHRONICITY: ICD-10-CM

## 2020-10-13 DIAGNOSIS — M25.561 RIGHT KNEE PAIN, UNSPECIFIED CHRONICITY: ICD-10-CM

## 2020-10-13 PROCEDURE — 97140 MANUAL THERAPY 1/> REGIONS: CPT | Performed by: PHYSICAL THERAPIST

## 2020-10-13 PROCEDURE — 97110 THERAPEUTIC EXERCISES: CPT | Performed by: PHYSICAL THERAPIST

## 2020-10-13 NOTE — PROGRESS NOTES
Physical Therapy Daily Progress Note      Visit # 18      Subjective   Pt reports pain 8/10 in R knee, lower back, and both shoulders.    Objective   See Exercise, Manual, and Modality Logs for complete treatment.       Assessment/Plan  Reports some pain relief with passive stretching. Limited ability to actively correct R knee valgus in standing. Good tolerance to exercise w/o c/o pain. Plan for reassessment next visit. Progress per POC.         Manual Therapy:    15     mins  83355;  Therapeutic Exercise:    25     mins  39328;     Neuromuscular Ryne:    0    mins  06735;    Therapeutic Activity:     0     mins  50502;     Gait Trainin     mins  68379;     Ultrasound:     0     mins  78450;    Work Hardening           0      mins 02353  Iontophoresis               0   mins 05132    Timed Treatment:   40   mins   Total Treatment:     55   mins    Tanna Dick, PT  Physical Therapist

## 2020-10-15 ENCOUNTER — TREATMENT (OUTPATIENT)
Dept: PHYSICAL THERAPY | Facility: CLINIC | Age: 60
End: 2020-10-15

## 2020-10-15 DIAGNOSIS — M25.562 LEFT KNEE PAIN, UNSPECIFIED CHRONICITY: ICD-10-CM

## 2020-10-15 DIAGNOSIS — M54.50 MIDLINE LOW BACK PAIN WITHOUT SCIATICA, UNSPECIFIED CHRONICITY: Primary | ICD-10-CM

## 2020-10-15 DIAGNOSIS — M25.512 LEFT SHOULDER PAIN, UNSPECIFIED CHRONICITY: ICD-10-CM

## 2020-10-15 DIAGNOSIS — M25.561 RIGHT KNEE PAIN, UNSPECIFIED CHRONICITY: ICD-10-CM

## 2020-10-15 DIAGNOSIS — M25.511 RIGHT SHOULDER PAIN, UNSPECIFIED CHRONICITY: ICD-10-CM

## 2020-10-15 DIAGNOSIS — R26.2 DIFFICULTY WALKING: ICD-10-CM

## 2020-10-15 PROCEDURE — 97110 THERAPEUTIC EXERCISES: CPT | Performed by: PHYSICAL THERAPIST

## 2020-10-15 PROCEDURE — 97140 MANUAL THERAPY 1/> REGIONS: CPT | Performed by: PHYSICAL THERAPIST

## 2020-10-15 PROCEDURE — 97530 THERAPEUTIC ACTIVITIES: CPT | Performed by: PHYSICAL THERAPIST

## 2020-10-15 NOTE — PROGRESS NOTES
Re-Assessment / Re-Certification        Patient: Larry Walters   : 1960  Diagnosis/ICD-10 Code:  Midline low back pain without sciatica, unspecified chronicity [M54.5]  Referring practitioner: Nati Dangelo DO  Date of Initial Evaluation:  Type: THERAPY  Noted: 2020  Patient seen for 19 sessions      Subjective:   Larry Walters reports: No new changes. Has a little pain in low back and shoulders. Rates pain 8/10. Describes crunching in R knee when he tries to correct alignment  Subjective Questionnaire: Did not complete due to time constraints  Clinical Progress: No significant change  Home Program Compliance: Yes  Treatment has included: therapeutic exercise, manual therapy, therapeutic activity and gait training    Subjective   Objective          Active Range of Motion   Left Shoulder   Flexion: 135 degrees   Abduction: 138 degrees   External rotation BTH: C2     Right Shoulder   Flexion: 98 degrees   Abduction: 102 degrees   External rotation BTH: Active external rotation behind the head: unable.       5x STS: 34.4s  TUG: Trial 1 29.5s, Trial 2 33.85s, Trial 3 31.8s (30s rest between trials)    Short Term Goals (Esatblished 20)  1) Outcome (VENKATESH) score < 55% disability to represent overall improved function MET  2) Independent in short term home exercise program MET  Long Term Goals (to be achieved within 90 days)   1) Outcome measure (VENKATESH) score >45%  to represent overall improved function MET  2) > good/ 4/5 muscle strength to improve pain, transfers/transitions, and walking MET  3) Patient to ambulate 250' with least restrictive assistive device and with minimal gait deviations NOT MET  4) Patient independent in long term home exercise program to address general mobility and strength improvements PARTIALLY MET    Assessment/Plan  Progress toward previous goals: Partially Met    5x STS and TUG unchanged sinec last assessment. No significant change in pain rating taken at beginning of session. Responds  well to manual lumbar traction and shoulder stretching w/ reports of some pain relief. Tolerates exercise well w/o c/o pain. Ambulating with quad cane and requires seated rest break after walking across clinic (~100 yds). Will continue to benefit from skilled PT to decrease pain and improve functional status.           Recommendations: Continue as planned 2x/week  Timeframe: 6 weeks  Prognosis to achieve goals: fair    PT Signature: Tanna Dick, ADRIANNA      Based upon review of the patient's progress and continued therapy plan, it is my medical opinion that Larry Walters should continue physical therapy treatment at [facility] PHYSICAL THERAP[facility]  [address] [place] [id number] [phone].    Signature: __________________________________  Nati Dangelo,     Manual Therapy:    10     mins  49740;  Therapeutic Exercise:    10     mins  63237;     Neuromuscular Ryne:    0    mins  10864;    Therapeutic Activity:     20     mins  58939;     Gait Trainin     mins  25240;     Ultrasound:     0     mins  33800;    Work Hardening           0      mins 55231  Iontophoresis               0   mins 33720    Timed Treatment:   40   mins   Total Treatment:     50   mins

## 2020-10-19 ENCOUNTER — TREATMENT (OUTPATIENT)
Dept: PHYSICAL THERAPY | Facility: CLINIC | Age: 60
End: 2020-10-19

## 2020-10-19 DIAGNOSIS — M25.511 RIGHT SHOULDER PAIN, UNSPECIFIED CHRONICITY: ICD-10-CM

## 2020-10-19 DIAGNOSIS — M25.562 LEFT KNEE PAIN, UNSPECIFIED CHRONICITY: ICD-10-CM

## 2020-10-19 DIAGNOSIS — M25.561 RIGHT KNEE PAIN, UNSPECIFIED CHRONICITY: ICD-10-CM

## 2020-10-19 DIAGNOSIS — M25.512 LEFT SHOULDER PAIN, UNSPECIFIED CHRONICITY: ICD-10-CM

## 2020-10-19 DIAGNOSIS — R26.2 DIFFICULTY WALKING: ICD-10-CM

## 2020-10-19 DIAGNOSIS — M54.50 MIDLINE LOW BACK PAIN WITHOUT SCIATICA, UNSPECIFIED CHRONICITY: Primary | ICD-10-CM

## 2020-10-19 PROCEDURE — 97110 THERAPEUTIC EXERCISES: CPT | Performed by: PHYSICAL THERAPIST

## 2020-10-19 PROCEDURE — 97140 MANUAL THERAPY 1/> REGIONS: CPT | Performed by: PHYSICAL THERAPIST

## 2020-10-19 NOTE — PROGRESS NOTES
Physical Therapy Daily Progress Note      Larry Walters reports: able to walk ~20 min at the most in one outing before the back bothers me and forces me to stop/sit down.    Subjective     Objective   See Exercise, Manual, and Modality Logs for complete treatment.       Assessment/Plan  Continued improvement noted with ambulation, exercise capability and endurance with continued PT efforts.  Still with ROM and strength deficits but more functional now than initially in regards to overall mobility.    Progress strengthening /stabilization /functional activity for affected musculature.           Manual Therapy:   12      mins  16070;  Therapeutic Exercise:    24     mins  75986;     Neuromuscular Ryne:        mins  38228;    Therapeutic Activity:   4       mins  87174;     Gait Training:           mins  84018;     Ultrasound:          mins  48807;    Electrical Stimulation:         mins  27498 ( );      Timed Treatment:  40    mins   Total Treatment:     40   mins    Joao Herbert PTA    Physical Therapist Assistant [place] [phone]

## 2020-10-27 ENCOUNTER — TREATMENT (OUTPATIENT)
Dept: PHYSICAL THERAPY | Facility: CLINIC | Age: 60
End: 2020-10-27

## 2020-10-27 DIAGNOSIS — R26.2 DIFFICULTY WALKING: ICD-10-CM

## 2020-10-27 DIAGNOSIS — M54.50 MIDLINE LOW BACK PAIN WITHOUT SCIATICA, UNSPECIFIED CHRONICITY: Primary | ICD-10-CM

## 2020-10-27 DIAGNOSIS — M25.512 LEFT SHOULDER PAIN, UNSPECIFIED CHRONICITY: ICD-10-CM

## 2020-10-27 DIAGNOSIS — M25.511 RIGHT SHOULDER PAIN, UNSPECIFIED CHRONICITY: ICD-10-CM

## 2020-10-27 DIAGNOSIS — M25.562 LEFT KNEE PAIN, UNSPECIFIED CHRONICITY: ICD-10-CM

## 2020-10-27 DIAGNOSIS — M25.561 RIGHT KNEE PAIN, UNSPECIFIED CHRONICITY: ICD-10-CM

## 2020-10-27 PROCEDURE — 97110 THERAPEUTIC EXERCISES: CPT | Performed by: PHYSICAL THERAPIST

## 2020-10-27 NOTE — PROGRESS NOTES
Physical Therapy Daily Progress Note      Visit # 21      Subjective   Pt reports no pain today. He is not sure what made this drastic change.    Objective   See Exercise, Manual, and Modality Logs for complete treatment.       Assessment/Plan  Drastic decrease in pain rating with no obvious reason. Good tolerance to initiation of circuit style exercise routine w/o c/o pain. Progress per POC.             Manual Therapy:    0     mins  56626;  Therapeutic Exercise:    40     mins  16081;     Neuromuscular Ryne:    0    mins  33194;    Therapeutic Activity:     0     mins  89445;     Gait Trainin     mins  18381;     Ultrasound:     0     mins  52669;    Work Hardening           0      mins 87648  Iontophoresis               0   mins 48682    Timed Treatment:   40   mins   Total Treatment:     45   mins    Tanna Dick, PT  Physical Therapist  
 used

## 2020-10-29 ENCOUNTER — TREATMENT (OUTPATIENT)
Dept: PHYSICAL THERAPY | Facility: CLINIC | Age: 60
End: 2020-10-29

## 2020-10-29 DIAGNOSIS — R26.2 DIFFICULTY WALKING: ICD-10-CM

## 2020-10-29 DIAGNOSIS — M25.562 LEFT KNEE PAIN, UNSPECIFIED CHRONICITY: ICD-10-CM

## 2020-10-29 DIAGNOSIS — M25.511 RIGHT SHOULDER PAIN, UNSPECIFIED CHRONICITY: ICD-10-CM

## 2020-10-29 DIAGNOSIS — M25.561 RIGHT KNEE PAIN, UNSPECIFIED CHRONICITY: ICD-10-CM

## 2020-10-29 DIAGNOSIS — M54.50 MIDLINE LOW BACK PAIN WITHOUT SCIATICA, UNSPECIFIED CHRONICITY: Primary | ICD-10-CM

## 2020-10-29 DIAGNOSIS — M25.512 LEFT SHOULDER PAIN, UNSPECIFIED CHRONICITY: ICD-10-CM

## 2020-10-29 PROCEDURE — 97110 THERAPEUTIC EXERCISES: CPT | Performed by: PHYSICAL THERAPIST

## 2020-10-29 NOTE — PROGRESS NOTES
Physical Therapy Daily Progress Note      Visit # 22      Subjective   Pt reports no new changes. No pain.    Objective   See Exercise, Manual, and Modality Logs for complete treatment.       Assessment/Plan  Excellent tolerance to exercise routine. Gait speed decreased throughout session. Pt declined increase in weight. Progress per POC.           Manual Therapy:    0     mins  86811;  Therapeutic Exercise:    45     mins  16321;     Neuromuscular Ryne:    0    mins  01630;    Therapeutic Activity:     0     mins  06432;     Gait Trainin     mins  63034;     Ultrasound:     0     mins  18979;    Work Hardening           0      mins 56624  Iontophoresis               0   mins 59462    Timed Treatment:   45   mins   Total Treatment:     50   mins    Tanna Dick, PT  Physical Therapist

## 2020-11-03 ENCOUNTER — TREATMENT (OUTPATIENT)
Dept: PHYSICAL THERAPY | Facility: CLINIC | Age: 60
End: 2020-11-03

## 2020-11-03 DIAGNOSIS — M25.511 RIGHT SHOULDER PAIN, UNSPECIFIED CHRONICITY: ICD-10-CM

## 2020-11-03 DIAGNOSIS — M25.561 RIGHT KNEE PAIN, UNSPECIFIED CHRONICITY: ICD-10-CM

## 2020-11-03 DIAGNOSIS — M54.50 MIDLINE LOW BACK PAIN WITHOUT SCIATICA, UNSPECIFIED CHRONICITY: Primary | ICD-10-CM

## 2020-11-03 DIAGNOSIS — M25.512 LEFT SHOULDER PAIN, UNSPECIFIED CHRONICITY: ICD-10-CM

## 2020-11-03 DIAGNOSIS — M25.562 LEFT KNEE PAIN, UNSPECIFIED CHRONICITY: ICD-10-CM

## 2020-11-03 DIAGNOSIS — R26.2 DIFFICULTY WALKING: ICD-10-CM

## 2020-11-03 PROCEDURE — 97110 THERAPEUTIC EXERCISES: CPT | Performed by: PHYSICAL THERAPIST

## 2020-11-03 PROCEDURE — 97530 THERAPEUTIC ACTIVITIES: CPT | Performed by: PHYSICAL THERAPIST

## 2020-11-03 NOTE — PROGRESS NOTES
Re-Assessment / Re-Certification        Patient: Larry Walters   : 1960  Diagnosis/ICD-10 Code:  Midline low back pain without sciatica, unspecified chronicity [M54.5]  Referring practitioner: Nati Dangelo DO  Date of Initial Evaluation:  Type: THERAPY  Noted: 2020  Patient seen for 23 sessions      Subjective:   Larry Walters reports: A little pain in shoulders. Rates pain 6/10. Still not able to reach overhead. My balance is good as long as I'm using my walking cane. No difficulty walking in/out of home as long as I use the cane. He has been putting dishes in  and making his own lunches. He is going for 15 min walk on weekends.  Subjective Questionnaire: Oswestry: 48% vs 42% 20 and 32% 20  Clinical Progress: Oswestry worsened, Pt report improved, No change in objective data  Home Program Compliance: Yes  Treatment has included: therapeutic exercise, manual therapy, therapeutic activity and gait training    Subjective   Objective     Active Range of Motion   Left Shoulder   Flexion: 138 degrees   Abduction: 122 degrees   External rotation BTH: C2     Right Shoulder   Flexion: 105 degrees   Abduction: 85 degrees   External rotation BTH: Active external rotation behind the head: unable.       5x STS: 42.63 (vs 41s 20)  TU.03s (vs 33s 20)       Short Term Goals (Esatblished 20)  1) Outcome (VENKATESH) score < 55% disability to represent overall improved function MET  2) Independent in short term home exercise program MET  Long Term Goals (to be achieved within 90 days)   1) Outcome measure (VENKATESH) score >45%  to represent overall improved function NOT MET  2) > good/ 4/5 muscle strength to improve pain, transfers/transitions, and walking MET  3) Patient to ambulate 250' with least restrictive assistive device and with minimal gait deviations NOT MET  4) Patient independent in long term home exercise program to address general mobility and strength improvements PARTIALLY  MET    Assessment/Plan  Progress toward previous goals: Partially Met    Pain rating decreased from 8-9/10 to 6/10. No significant change in functional testing (TUG, 5xSTS). Continues to have limited shoulder mobility L>R, decreased speed with transfers and ambulation, and poor balance requiring cane at all times. Good tolerance to recent introduction of circuit style functional strengthening routine. Will continue to benefit from skilled PT to decrease pain and improve strength, endurance, and balance, so that pt may be independent w/ ADLs.          Recommendations: Continue as planned  Timeframe: 2 months  Prognosis to achieve goals: fair    PT Signature: Tanna Dick, ADRIANNA      Based upon review of the patient's progress and continued therapy plan, it is my medical opinion that Larry Walters should continue physical therapy treatment at [facility] PH[facility] THERAP[facility]  [address] [place] [id number] [phone].    Signature: __________________________________  Nati Dangelo,     Manual Therapy:    0     mins  86628;  Therapeutic Exercise:    30     mins  47979;     Neuromuscular Ryne:    0    mins  07474;    Therapeutic Activity:     10     mins  74757;     Gait Trainin     mins  36215;     Ultrasound:     0     mins  18382;    Work Hardening           0      mins 51460  Iontophoresis               0   mins 57554    Timed Treatment:   40   mins   Total Treatment:     45   mins

## 2020-11-10 ENCOUNTER — TREATMENT (OUTPATIENT)
Dept: PHYSICAL THERAPY | Facility: CLINIC | Age: 60
End: 2020-11-10

## 2020-11-10 DIAGNOSIS — M25.561 RIGHT KNEE PAIN, UNSPECIFIED CHRONICITY: ICD-10-CM

## 2020-11-10 DIAGNOSIS — M25.512 LEFT SHOULDER PAIN, UNSPECIFIED CHRONICITY: ICD-10-CM

## 2020-11-10 DIAGNOSIS — M25.562 LEFT KNEE PAIN, UNSPECIFIED CHRONICITY: ICD-10-CM

## 2020-11-10 DIAGNOSIS — M54.50 MIDLINE LOW BACK PAIN WITHOUT SCIATICA, UNSPECIFIED CHRONICITY: Primary | ICD-10-CM

## 2020-11-10 DIAGNOSIS — M25.511 RIGHT SHOULDER PAIN, UNSPECIFIED CHRONICITY: ICD-10-CM

## 2020-11-10 DIAGNOSIS — R26.2 DIFFICULTY WALKING: ICD-10-CM

## 2020-11-10 PROCEDURE — 97530 THERAPEUTIC ACTIVITIES: CPT | Performed by: PHYSICAL THERAPIST

## 2020-11-10 PROCEDURE — 97110 THERAPEUTIC EXERCISES: CPT | Performed by: PHYSICAL THERAPIST

## 2020-11-10 NOTE — PROGRESS NOTES
"Physical Therapy Daily Progress Note      Visit # 24      Subjective   No new changes. No c/o pain.    Objective   Circuit Time  Round 1: 13'8\"  Round 2: 14'45\"  Round 3: 15'    See Exercise, Manual, and Modality Logs for complete treatment.       Assessment/Plan  Excellent tolerance to circuit progression w/o c/o pain. Demonstrated improved shoulder AAROM overhead w/ new exercise. Progress per POC.         Manual Therapy:    0     mins  45229;  Therapeutic Exercise:    25     mins  33131;     Neuromuscular Ryne:    0    mins  39712;    Therapeutic Activity:     15     mins  41238;     Gait Trainin     mins  78330;     Ultrasound:     0     mins  43660;    Work Hardening           0      mins 70962  Iontophoresis               0   mins 59290    Timed Treatment:   40   mins   Total Treatment:     45   mins    Tanna Dick, PT  Physical Therapist  "

## 2020-11-17 ENCOUNTER — TREATMENT (OUTPATIENT)
Dept: PHYSICAL THERAPY | Facility: CLINIC | Age: 60
End: 2020-11-17

## 2020-11-17 DIAGNOSIS — M54.50 MIDLINE LOW BACK PAIN WITHOUT SCIATICA, UNSPECIFIED CHRONICITY: Primary | ICD-10-CM

## 2020-11-17 DIAGNOSIS — R26.2 DIFFICULTY WALKING: ICD-10-CM

## 2020-11-17 DIAGNOSIS — M25.512 LEFT SHOULDER PAIN, UNSPECIFIED CHRONICITY: ICD-10-CM

## 2020-11-17 DIAGNOSIS — M25.562 LEFT KNEE PAIN, UNSPECIFIED CHRONICITY: ICD-10-CM

## 2020-11-17 DIAGNOSIS — M25.511 RIGHT SHOULDER PAIN, UNSPECIFIED CHRONICITY: ICD-10-CM

## 2020-11-17 DIAGNOSIS — M25.561 RIGHT KNEE PAIN, UNSPECIFIED CHRONICITY: ICD-10-CM

## 2020-11-17 PROCEDURE — 97110 THERAPEUTIC EXERCISES: CPT | Performed by: PHYSICAL THERAPIST

## 2020-11-17 PROCEDURE — 97530 THERAPEUTIC ACTIVITIES: CPT | Performed by: PHYSICAL THERAPIST

## 2020-11-17 NOTE — PROGRESS NOTES
"Physical Therapy Daily Progress Note      Visit # 25      Subjective   Shoulders hurt some. Rates pain 7/10.    Objective   Circuit Time  Round 1: 12'58\"  Round 2: 13\"13\"  Round 3: 12'39    See Exercise, Manual, and Modality Logs for complete treatment.       Assessment/Plan  Able to increase weight and decrease time completing strength and endurance circuit. Pt continues to have decrease speed with gait and transfers. No c/o pain with exercise. Progress per POC.           Manual Therapy:    0     mins  58821;  Therapeutic Exercise:    15     mins  65524;     Neuromuscular Ryne:    0    mins  08085;    Therapeutic Activity:     25     mins  08249;     Gait Trainin     mins  46260;     Ultrasound:     0     mins  45071;    Work Hardening           0      mins 04885  Iontophoresis               0   mins 34012    Timed Treatment:   40   mins   Total Treatment:     45   mins    Tanna Dick, PT  Physical Therapist  "

## 2020-11-20 ENCOUNTER — TREATMENT (OUTPATIENT)
Dept: PHYSICAL THERAPY | Facility: CLINIC | Age: 60
End: 2020-11-20

## 2020-11-20 DIAGNOSIS — M54.50 MIDLINE LOW BACK PAIN WITHOUT SCIATICA, UNSPECIFIED CHRONICITY: Primary | ICD-10-CM

## 2020-11-20 DIAGNOSIS — M25.561 RIGHT KNEE PAIN, UNSPECIFIED CHRONICITY: ICD-10-CM

## 2020-11-20 DIAGNOSIS — M25.511 RIGHT SHOULDER PAIN, UNSPECIFIED CHRONICITY: ICD-10-CM

## 2020-11-20 DIAGNOSIS — M25.512 LEFT SHOULDER PAIN, UNSPECIFIED CHRONICITY: ICD-10-CM

## 2020-11-20 DIAGNOSIS — M25.562 LEFT KNEE PAIN, UNSPECIFIED CHRONICITY: ICD-10-CM

## 2020-11-20 DIAGNOSIS — R26.2 DIFFICULTY WALKING: ICD-10-CM

## 2020-11-20 PROCEDURE — 97110 THERAPEUTIC EXERCISES: CPT | Performed by: PHYSICAL THERAPIST

## 2020-11-20 PROCEDURE — 97530 THERAPEUTIC ACTIVITIES: CPT | Performed by: PHYSICAL THERAPIST

## 2020-11-20 NOTE — PROGRESS NOTES
"Physical Therapy Daily Progress Note      Visit # 26      Subjective   Pt reports he is doing well. No complaints.    Objective   Circuit Time  1: 12'16\"  2: 12' 43\"  3: 12'38\"  See Exercise, Manual, and Modality Logs for complete treatment.       Assessment/Plan  Continued improvement in gait speed and strength as measured by circuit time and resistance used with exercise. No c/o pain. Progress per POC.         Manual Therapy:    0     mins  36729;  Therapeutic Exercise:    15     mins  42621;     Neuromuscular Ryne:    0    mins  93365;    Therapeutic Activity:     25     mins  08773;     Gait Trainin     mins  31656;     Ultrasound:     0     mins  10124;    Work Hardening           0      mins 99000  Iontophoresis               0   mins 26727    Timed Treatment:   40   mins   Total Treatment:     45   mins    Tanna Dick, PT  Physical Therapist  "

## 2020-11-23 ENCOUNTER — TREATMENT (OUTPATIENT)
Dept: PHYSICAL THERAPY | Facility: CLINIC | Age: 60
End: 2020-11-23

## 2020-11-23 DIAGNOSIS — R26.2 DIFFICULTY WALKING: ICD-10-CM

## 2020-11-23 DIAGNOSIS — M25.511 RIGHT SHOULDER PAIN, UNSPECIFIED CHRONICITY: ICD-10-CM

## 2020-11-23 DIAGNOSIS — M25.512 LEFT SHOULDER PAIN, UNSPECIFIED CHRONICITY: ICD-10-CM

## 2020-11-23 DIAGNOSIS — M54.50 MIDLINE LOW BACK PAIN WITHOUT SCIATICA, UNSPECIFIED CHRONICITY: Primary | ICD-10-CM

## 2020-11-23 DIAGNOSIS — M25.562 LEFT KNEE PAIN, UNSPECIFIED CHRONICITY: ICD-10-CM

## 2020-11-23 DIAGNOSIS — M25.561 RIGHT KNEE PAIN, UNSPECIFIED CHRONICITY: ICD-10-CM

## 2020-11-23 PROCEDURE — 97110 THERAPEUTIC EXERCISES: CPT | Performed by: PHYSICAL THERAPIST

## 2020-11-23 PROCEDURE — 97530 THERAPEUTIC ACTIVITIES: CPT | Performed by: PHYSICAL THERAPIST

## 2020-11-23 NOTE — PROGRESS NOTES
"Physical Therapy Daily Progress Note      Visit # 27      Subjective   Pt reports no complaints.     Objective   Circuit Time:  1. 13'  2. 13'30\"  3. 14'00  See Exercise, Manual, and Modality Logs for complete treatment.       Assessment/Plan  No complaints of pain. Continues to have decreased step length and gait speed. Minor LOB x1 when changing direction while ambulating with cane. Pt did not require PT assistance to maintain balance. Progress per POC.           Manual Therapy:    0     mins  22132;  Therapeutic Exercise:    30     mins  70149;     Neuromuscular Ryne:    0    mins  46022;    Therapeutic Activity:     10     mins  99943;     Gait Trainin     mins  92661;     Ultrasound:     0     mins  41885;    Work Hardening           0      mins 70600  Iontophoresis               0   mins 25772    Timed Treatment:   40   mins   Total Treatment:     45   mins    Tanna Dick, PT  Physical Therapist  "

## 2020-11-25 ENCOUNTER — TELEPHONE (OUTPATIENT)
Dept: PHYSICAL THERAPY | Facility: CLINIC | Age: 60
End: 2020-11-25

## 2021-03-24 ENCOUNTER — BULK ORDERING (OUTPATIENT)
Dept: CASE MANAGEMENT | Facility: OTHER | Age: 61
End: 2021-03-24

## 2021-03-24 DIAGNOSIS — Z23 IMMUNIZATION DUE: ICD-10-CM

## 2021-04-01 ENCOUNTER — TREATMENT (OUTPATIENT)
Dept: PHYSICAL THERAPY | Facility: CLINIC | Age: 61
End: 2021-04-01

## 2021-04-01 DIAGNOSIS — G89.29 CHRONIC LEFT SHOULDER PAIN: ICD-10-CM

## 2021-04-01 DIAGNOSIS — Z74.09 IMPAIRED FUNCTIONAL MOBILITY, BALANCE, GAIT, AND ENDURANCE: ICD-10-CM

## 2021-04-01 DIAGNOSIS — G89.29 CHRONIC RIGHT SHOULDER PAIN: Primary | ICD-10-CM

## 2021-04-01 DIAGNOSIS — M25.511 CHRONIC RIGHT SHOULDER PAIN: Primary | ICD-10-CM

## 2021-04-01 DIAGNOSIS — M25.512 CHRONIC LEFT SHOULDER PAIN: ICD-10-CM

## 2021-04-01 PROCEDURE — 97162 PT EVAL MOD COMPLEX 30 MIN: CPT | Performed by: PHYSICAL THERAPIST

## 2021-04-01 NOTE — PROGRESS NOTES
Physical Therapy Initial Evaluation and Plan of Care    Patient: Larry Walters   : 1960  Diagnosis/ICD-10 Code:  Chronic right shoulder pain [M25.511, G89.29]  Referring practitioner: No ref. provider found  Date of Initial Visit: 2021  Today's Date: 2021  Patient seen for 1 sessions           Subjective Questionnaire: LEFS: 51   Quick DASH: 56.82      Subjective Evaluation    History of Present Illness  Mechanism of injury: C/o shoulder pain, R>L. Will be seeing Dr. Bernal  for shoulder pain. Has had knee/shoulder injections in the past. Initial shoulder injections helpful, but ~1 month ago he was getting in passenger seat, reached for door, and it felt like L shoulder popped out. Popped back in immediately and has been hurting ever since. Painfree at rest. Pain lifting and raising arm overhead.    Leg strength, balance, and endurance are limited. Knees are fine. I help out around the house (laundry, filling my own drinks, filling dog bowls, showering, etc). Has been walking w/o AD the past few months. No falls. Has continued HEP ~4-5x/week.    Takes ibuprofen and prescription pain medication, 2x/day      Patient Occupation: N/A Pain  Current pain ratin  At worst pain ratin  Location: B shoulders (middle deltoid)  Quality: dull ache and tight  Relieving factors: medications  Aggravating factors: outstretched reach and lifting    Social Support  Lives in: apartment  Lives with: significant other    Treatments  Previous treatment: physical therapy  Current treatment: physical therapy  Patient Goals  Patient goals for therapy: decreased pain, improved balance, increased motion, increased strength and independence with ADLs/IADLs             Objective          Active Range of Motion   Left Shoulder   Flexion: 149 degrees   Abduction: 105 degrees     Right Shoulder   Flexion: 100 degrees with pain  Abduction: 76 degrees with pain    Additional Active Range of Motion Details  Received COVID  vaccine in R arm 3/31/21    P! R anterior shoulder  IR reach to sacrum, Unable to reach behind head    Strength/Myotome Testing     Left Shoulder     Planes of Motion   Flexion: 5   Abduction: 4+   External rotation at 0°: 5   Internal rotation at 0°: 5     Isolated Muscles   Biceps: 5   Triceps: 5     Right Shoulder     Planes of Motion   Flexion: 5   Abduction: 4+ (P! R shoulder)   External rotation at 0°: 5   Internal rotation at 0°: 5     Isolated Muscles   Biceps: 5   Triceps: 5     Left Hip   Planes of Motion   Flexion: 5    Right Hip   Planes of Motion   Flexion: 5    Left Knee   Flexion: 4-  Extension: 4+    Right Knee   Flexion: 4-  Extension: 4+    Left Ankle/Foot   Dorsiflexion: 5    Right Ankle/Foot   Dorsiflexion: 5    Tests     Left Shoulder   Positive Neer's.   Negative empty can, full can and Hawkin's.     Right Shoulder   Positive empty can, Hawkin's and Neer's.   Negative full can.     Additional Tests Details  [place]: R pain pron & sup    Functional Assessment     Comments  5x STS, no hands: 35s  TUG, no AD: 26s       SL balance: unable (B)    Assessment & Plan     Assessment  Impairments: abnormal coordination, abnormal gait, abnormal muscle firing, abnormal muscle tone, abnormal or restricted ROM, activity intolerance, impaired balance, impaired physical strength, lacks appropriate home exercise program, pain with function and safety issue  Assessment details: Pt presents w/ bilateral shoulder pain, impaired gait, and low endurance. Objective findings include limited and painful shoulder AROM, positive special testing of shoulders (see above), UE & LE weakness, impaired gait, and decreased speed on functional tests. Pt will benefit from skilled PT services in order to address listed impairments and increase tolerance to normal daily activities including ADLs and recreational activities.    Prognosis: good  Functional Limitations: carrying objects, lifting, walking, pulling, pushing, moving in  bed, standing, stooping, reaching behind back, reaching overhead and unable to perform repetitive tasks  Goals  Plan Goals: Short Term Goals: 4 weeks. Patient will:  1. Be independent with initial HEP  2. Demonstrate >10deg improvement in shoulder AROM in all planes  3. Demonstrate safe independent ambulation in clinic w/o AD    Long Term Goals: 8-12 weeks. Patient will:  1. Demonstrate improved Bilateral upper/lower extremity MMT of >/= 4+/5 to allow for performance of daily activities without pain.  2. Demonstrate upper extremity AROM WFL to allow for return to household & recreational activities w/o increased symptoms  3. Score w/in age matched norms for 5x STS and TUG functional testing demonstrating improved gait speed and endurance  4. Perceived disability </= 10% as measured by LEFS and Quick DASH  5. Be independent with long term HEP    Plan  Therapy options: will be seen for skilled physical therapy services  Planned modality interventions: cryotherapy, electrical stimulation/Russian stimulation and thermotherapy (hydrocollator packs)  Planned therapy interventions: abdominal trunk stabilization, ADL retraining, body mechanics training, balance/weight-bearing training, flexibility, functional ROM exercises, gait training, home exercise program, joint mobilization, manual therapy, neuromuscular re-education, soft tissue mobilization, spinal/joint mobilization, strengthening, stretching and therapeutic activities  Frequency: 3x week  Duration in weeks: 12  Treatment plan discussed with: patient        Manual Therapy:    0     mins  59294;  Therapeutic Exercise:    0     mins  50950;     Neuromuscular Ryen:    0    mins  67139;    Therapeutic Activity:     0     mins  20380;     Gait Trainin     mins  70200;     Ultrasound:     0     mins  60163;    Electrical Stimulation:    0     mins  67712 ( );  Dry Needling     0     mins self-pay    Timed Treatment:   0   mins   Total Treatment:     50    mins    PT SIGNATURE: Tanna Zaragoza, PT   DATE TREATMENT INITIATED: 4/1/2021    Initial Certification  Certification Period: 6/30/2021  I certify that the therapy services are furnished while this patient is under my care.  The services outlined above are required by this patient, and will be reviewed every 90 days.     PHYSICIAN:       DATE:     Please sign and return via fax to [phone].. Thank you, [facility].

## 2021-04-09 ENCOUNTER — TREATMENT (OUTPATIENT)
Dept: PHYSICAL THERAPY | Facility: CLINIC | Age: 61
End: 2021-04-09

## 2021-04-09 DIAGNOSIS — G89.29 CHRONIC RIGHT SHOULDER PAIN: Primary | ICD-10-CM

## 2021-04-09 DIAGNOSIS — G89.29 CHRONIC LEFT SHOULDER PAIN: ICD-10-CM

## 2021-04-09 DIAGNOSIS — M25.512 CHRONIC LEFT SHOULDER PAIN: ICD-10-CM

## 2021-04-09 DIAGNOSIS — R26.2 DIFFICULTY WALKING: ICD-10-CM

## 2021-04-09 DIAGNOSIS — Z74.09 IMPAIRED FUNCTIONAL MOBILITY, BALANCE, GAIT, AND ENDURANCE: ICD-10-CM

## 2021-04-09 DIAGNOSIS — M54.50 MIDLINE LOW BACK PAIN WITHOUT SCIATICA, UNSPECIFIED CHRONICITY: ICD-10-CM

## 2021-04-09 DIAGNOSIS — M25.511 CHRONIC RIGHT SHOULDER PAIN: Primary | ICD-10-CM

## 2021-04-09 PROCEDURE — 97140 MANUAL THERAPY 1/> REGIONS: CPT | Performed by: PHYSICAL THERAPIST

## 2021-04-09 PROCEDURE — 97110 THERAPEUTIC EXERCISES: CPT | Performed by: PHYSICAL THERAPIST

## 2021-04-09 PROCEDURE — 97530 THERAPEUTIC ACTIVITIES: CPT | Performed by: PHYSICAL THERAPIST

## 2021-04-09 NOTE — PROGRESS NOTES
Physical Therapy Daily Treatment Note      Visit # 2      Subjective   Body feels pretty good today.    Objective   See Exercise, Manual, and Modality Logs for complete treatment.       Assessment/Plan  Excellent tolerance to all exercise w/o c/om pain. Required verbal cues to avoid R knee valgus and for upright posture. Issued HEP. Progress per POC.                 Manual Therapy:    8     mins  47760;  Therapeutic Exercise:    25     mins  58759;     Neuromuscular Ryne:    0    mins  46401;    Therapeutic Activity:     15     mins  75875;     Gait Trainin     mins  42563;     Ultrasound:     0     mins  90812;    Work Hardening           0      mins 63286  Iontophoresis               0   mins 31126    Timed Treatment:   48   mins   Total Treatment:     60   mins    Tanna Zaragoza PT  Physical Therapist

## 2021-04-16 ENCOUNTER — TREATMENT (OUTPATIENT)
Dept: PHYSICAL THERAPY | Facility: CLINIC | Age: 61
End: 2021-04-16

## 2021-04-16 DIAGNOSIS — G89.29 CHRONIC RIGHT SHOULDER PAIN: Primary | ICD-10-CM

## 2021-04-16 DIAGNOSIS — M25.511 CHRONIC RIGHT SHOULDER PAIN: Primary | ICD-10-CM

## 2021-04-16 DIAGNOSIS — Z74.09 IMPAIRED FUNCTIONAL MOBILITY, BALANCE, GAIT, AND ENDURANCE: ICD-10-CM

## 2021-04-16 DIAGNOSIS — G89.29 CHRONIC LEFT SHOULDER PAIN: ICD-10-CM

## 2021-04-16 DIAGNOSIS — M25.512 CHRONIC LEFT SHOULDER PAIN: ICD-10-CM

## 2021-04-16 PROCEDURE — 97140 MANUAL THERAPY 1/> REGIONS: CPT | Performed by: PHYSICAL THERAPIST

## 2021-04-16 PROCEDURE — 97530 THERAPEUTIC ACTIVITIES: CPT | Performed by: PHYSICAL THERAPIST

## 2021-04-16 PROCEDURE — 97110 THERAPEUTIC EXERCISES: CPT | Performed by: PHYSICAL THERAPIST

## 2021-04-16 NOTE — PROGRESS NOTES
Physical Therapy Daily Treatment Note      Visit # 3      Subjective   Had both shoulders injected yesterday. Reports less pain in shoulders today.  Objective   See Exercise, Manual, and Modality Logs for complete treatment.       Assessment/Plan  Excellent tolerance to all exercise w/o c/o pain. Requires 1 HHA for marching and stepping over hurdles due to limited balance. Progress per POC.                 Manual Therapy:    8     mins  00505;  Therapeutic Exercise:    10     mins  60365;     Neuromuscular Ryne:    0    mins  62771;    Therapeutic Activity:     10     mins  50550;     Gait Training:      10     mins  62609;     Ultrasound:     0     mins  75458;    Work Hardening           0      mins 97939  Iontophoresis               0   mins 40227    Timed Treatment:   38   mins   Total Treatment:     45   mins    Tanna Zaragoza PT  Physical Therapist

## 2021-04-22 ENCOUNTER — TREATMENT (OUTPATIENT)
Dept: PHYSICAL THERAPY | Facility: CLINIC | Age: 61
End: 2021-04-22

## 2021-04-22 DIAGNOSIS — G89.29 CHRONIC LEFT SHOULDER PAIN: ICD-10-CM

## 2021-04-22 DIAGNOSIS — M25.511 CHRONIC RIGHT SHOULDER PAIN: Primary | ICD-10-CM

## 2021-04-22 DIAGNOSIS — M25.512 CHRONIC LEFT SHOULDER PAIN: ICD-10-CM

## 2021-04-22 DIAGNOSIS — Z74.09 IMPAIRED FUNCTIONAL MOBILITY, BALANCE, GAIT, AND ENDURANCE: ICD-10-CM

## 2021-04-22 DIAGNOSIS — G89.29 CHRONIC RIGHT SHOULDER PAIN: Primary | ICD-10-CM

## 2021-04-22 PROCEDURE — 97140 MANUAL THERAPY 1/> REGIONS: CPT | Performed by: PHYSICAL THERAPIST

## 2021-04-22 PROCEDURE — 97110 THERAPEUTIC EXERCISES: CPT | Performed by: PHYSICAL THERAPIST

## 2021-04-22 PROCEDURE — 97116 GAIT TRAINING THERAPY: CPT | Performed by: PHYSICAL THERAPIST

## 2021-04-22 NOTE — PROGRESS NOTES
Physical Therapy Daily Treatment Note      Visit # 4      Subjective   No new changes. No pain.    Objective   See Exercise, Manual, and Modality Logs for complete treatment.       Assessment/Plan  Tolerates exercise well w/o c/o pain. Modified sit to stands with hip adduction for improved hip/knee alignment. Gait mechanics greatly limited by R knee mobility. Progress per POC.         Manual Therapy:    10     mins  02158;  Therapeutic Exercise:    10     mins  53886;     Neuromuscular Ryne:    0    mins  54038;    Therapeutic Activity:     10     mins  84306;     Gait Training:      10     mins  50273;     Ultrasound:     0     mins  43426;    Work Hardening           0      mins 91500  Iontophoresis               0   mins 89405    Timed Treatment:   40   mins   Total Treatment:     45   mins    Tanna Zaragoza, PT  Physical Therapist

## 2021-04-29 ENCOUNTER — TREATMENT (OUTPATIENT)
Dept: PHYSICAL THERAPY | Facility: CLINIC | Age: 61
End: 2021-04-29

## 2021-04-29 DIAGNOSIS — G89.29 CHRONIC LEFT SHOULDER PAIN: ICD-10-CM

## 2021-04-29 DIAGNOSIS — M25.511 CHRONIC RIGHT SHOULDER PAIN: Primary | ICD-10-CM

## 2021-04-29 DIAGNOSIS — G89.29 CHRONIC RIGHT SHOULDER PAIN: Primary | ICD-10-CM

## 2021-04-29 DIAGNOSIS — Z74.09 IMPAIRED FUNCTIONAL MOBILITY, BALANCE, GAIT, AND ENDURANCE: ICD-10-CM

## 2021-04-29 DIAGNOSIS — M25.512 CHRONIC LEFT SHOULDER PAIN: ICD-10-CM

## 2021-04-29 PROCEDURE — 97140 MANUAL THERAPY 1/> REGIONS: CPT | Performed by: PHYSICAL THERAPIST

## 2021-04-29 PROCEDURE — 97530 THERAPEUTIC ACTIVITIES: CPT | Performed by: PHYSICAL THERAPIST

## 2021-04-29 PROCEDURE — 97110 THERAPEUTIC EXERCISES: CPT | Performed by: PHYSICAL THERAPIST

## 2021-04-29 NOTE — PROGRESS NOTES
Physical Therapy Daily Treatment Note      Visit # 5      Subjective   Had second round of shoulder injections. Shoulders feel less painful and less stiff.    Objective   See Exercise, Manual, and Modality Logs for complete treatment.       Assessment/Plan  Excellent tolerance to all exercise w/o c/o pain. Updated HEP to include bilateral external rotation for shoulder mobility and upright posture. Progress per POC.                 Manual Therapy:    10     mins  21725;  Therapeutic Exercise:    15     mins  52022;     Neuromuscular Ryne:    0    mins  59047;    Therapeutic Activity:     15     mins  71013;     Gait Trainin     mins  49585;     Ultrasound:     0     mins  08155;    Work Hardening           0      mins 95168  Iontophoresis               0   mins 39084    Timed Treatment:   40   mins   Total Treatment:     60   mins    Tanna Zaragoza, PT  Physical Therapist

## 2021-04-30 ENCOUNTER — TREATMENT (OUTPATIENT)
Dept: PHYSICAL THERAPY | Facility: CLINIC | Age: 61
End: 2021-04-30

## 2021-04-30 DIAGNOSIS — G89.29 CHRONIC LEFT SHOULDER PAIN: ICD-10-CM

## 2021-04-30 DIAGNOSIS — M25.511 CHRONIC RIGHT SHOULDER PAIN: Primary | ICD-10-CM

## 2021-04-30 DIAGNOSIS — Z74.09 IMPAIRED FUNCTIONAL MOBILITY, BALANCE, GAIT, AND ENDURANCE: ICD-10-CM

## 2021-04-30 DIAGNOSIS — G89.29 CHRONIC RIGHT SHOULDER PAIN: Primary | ICD-10-CM

## 2021-04-30 DIAGNOSIS — M25.512 CHRONIC LEFT SHOULDER PAIN: ICD-10-CM

## 2021-04-30 PROCEDURE — 97116 GAIT TRAINING THERAPY: CPT | Performed by: PHYSICAL THERAPIST

## 2021-04-30 PROCEDURE — 97110 THERAPEUTIC EXERCISES: CPT | Performed by: PHYSICAL THERAPIST

## 2021-04-30 NOTE — PROGRESS NOTES
Physical Therapy Daily Treatment Note      Visit # 6      Subjective   No new changes.    Objective   See Exercise, Manual, and Modality Logs for complete treatment.       Assessment/Plan  Required verbal cues for upright posture during resisted extension. Tolerated exercise well w/o c/o pain. Pt SOB after hurdles; ok after seated rest break.               Manual Therapy:    0     mins  97107;  Therapeutic Exercise:    25     mins  94759;     Neuromuscular Ryne:    0    mins  21075;    Therapeutic Activity:     15     mins  29511;     Gait Training:      10     mins  03360;     Ultrasound:     0     mins  78568;    Work Hardening           0      mins 31417  Iontophoresis               0   mins 75344    Timed Treatment:   50   mins   Total Treatment:     60   mins    Tanna Zaragoza, PT  Physical Therapist

## 2021-05-07 ENCOUNTER — TREATMENT (OUTPATIENT)
Dept: PHYSICAL THERAPY | Facility: CLINIC | Age: 61
End: 2021-05-07

## 2021-05-07 DIAGNOSIS — G89.29 CHRONIC LEFT SHOULDER PAIN: ICD-10-CM

## 2021-05-07 DIAGNOSIS — G89.29 CHRONIC RIGHT SHOULDER PAIN: Primary | ICD-10-CM

## 2021-05-07 DIAGNOSIS — Z74.09 IMPAIRED FUNCTIONAL MOBILITY, BALANCE, GAIT, AND ENDURANCE: ICD-10-CM

## 2021-05-07 DIAGNOSIS — M25.511 CHRONIC RIGHT SHOULDER PAIN: Primary | ICD-10-CM

## 2021-05-07 DIAGNOSIS — M25.512 CHRONIC LEFT SHOULDER PAIN: ICD-10-CM

## 2021-05-07 PROCEDURE — 97110 THERAPEUTIC EXERCISES: CPT | Performed by: PHYSICAL THERAPIST

## 2021-05-07 PROCEDURE — 97530 THERAPEUTIC ACTIVITIES: CPT | Performed by: PHYSICAL THERAPIST

## 2021-05-07 NOTE — PROGRESS NOTES
Physical Therapy Daily Progress Note  7 treatments  Subjective     Larry Walters reports: R shoulder pain is CC at this time. Notes that he has difficulty at home with getting in/ out of chair.         Objective   See Exercise, Manual, and Modality Logs for complete treatment.       Assessment/Plan  Patient tolerated all treatment well. Noted no pain with the exercises. Patient continues to need skilled PT to work on functional mobility, safety with ADLs and functional activity tolerance. Asked about surgical timeline but patient is unsure at this time.       Progress per Plan of Care and Progress strengthening /stabilization /functional activity           Manual Therapy:    8     mins  35884;  Therapeutic Exercise:    24     mins  58445;     Neuromuscular Ryne:        mins  78628;    Therapeutic Activity:     15     mins  94425;     Gait Training:           mins  10761;     Ultrasound:          mins  80458;    Electrical Stimulation:         mins  05495 ( );  Dry Needling          mins self-pay    Timed Treatment:   47   mins   Total Treatment:     47   mins    Roberto Hogan PT DPT  Physical Therapist  [place] License # [id number]

## 2021-05-12 NOTE — PROGRESS NOTES
Re-Assessment       Patient: Larry Walters   : 1960  Diagnosis/ICD-10 Code:  Chronic right shoulder pain [M25.511, G89.29]  Referring practitioner: Nikki Mcgarry MD  Date of Initial Visit: Type: THERAPY  Noted: 2021  Today's Date: 2021  Patient seen for 8 sessions      Subjective:   Subjective Questionnaire: QuickDASH: 34.1%  LEFS: 46/80  Clinical Progress: improved  Home Program Compliance: Yes  Treatment has included: therapeutic exercise, manual therapy and therapeutic activity    Subjective Evaluation    Pain  Current pain ratin         Objective          Active Range of Motion   Left Shoulder   Flexion: 120 degrees   Abduction: 104 degrees     Right Shoulder   Flexion: 102 degrees   Abduction: 80 degrees     Passive Range of Motion   Left Shoulder   Flexion: 150 degrees   Adduction: 150 degrees   External rotation 45°: 40 degrees   Internal rotation 45°: 70 degrees     Right Shoulder   Flexion: 140 degrees   External rotation 45°: 50 degrees   Internal rotation 45°: 50 degrees     Strength/Myotome Testing     Left Shoulder     Planes of Motion   Flexion: 4+   Abduction: 4+   External rotation at 0°: 4+   Internal rotation at 0°: 5     Right Shoulder     Planes of Motion   Flexion: 4+ (shrug)   Abduction: 4+   External rotation at 0°: 4+   Internal rotation at 0°: 5     Left Hip   Planes of Motion   Flexion: 4+    Right Hip   Planes of Motion   Flexion: 3+    Ambulation   Weight-Bearing Status   Assistive device used: none    Observational Gait   Gait: antalgic   Decreased walking speed, stride length, right stance time, left step length and right step length.   Left foot contact pattern: foot flat  Right foot contact pattern: foot flat    Quality of Movement During Gait   Trunk  Forward lean.     Functional Assessment     Comments  5TSTS - 31.87 without UE assist  TUG - 20.87      Assessment/Plan   Pt is making steady progress toward goals with improvements in functional sit to stand and gait  speed.  He is now able to ambulate into the clinic without AD though with less than optimal gait mechanics. Pt continues to exhibit limitations in shoulder A/PROM though overall pain complaints are much improved. Pt would benefit from a continued course of skilled PT services in order to address listed impairments and increase tolerance to normal daily activities.     Progress toward previous goals: Partially Met    Goal Review  Short Term Goals: 4 weeks. Patient will:  1. Be independent with initial HEP  2. Demonstrate >10deg improvement in shoulder AROM in all planes - UNMET  3. Demonstrate safe independent ambulation in clinic w/o AD - MET    Long Term Goals: 8-12 weeks. Patient will:  1. Demonstrate improved Bilateral upper/lower extremity MMT of >/= 4+/5 to allow for performance of daily activities without pain. - progressing  2. Demonstrate upper extremity AROM WFL to allow for return to household & recreational activities w/o increased symptoms - UNMET  3. Score w/in age matched norms for 5x STS and TUG functional testing demonstrating improved gait speed and endurance - progressing  4. Perceived disability </= 10% as measured by LEFS and Quick DASH - UNMET  5. Be independent with long term HEP - UNMET     Recommendations: Continue as planned  Timeframe: 1 month  Prognosis to achieve goals: good    PT Signature: Inocencia Alejo, PT  [place] License # [id number]    Based upon review of the patient's progress and continued therapy plan, it is my medical opinion that Larry Walters should continue physical therapy treatment at [facility]  [address] [place] [id number] [phone].    Signature: __________________________________  Nikki Mcgarry MD    Timed:  Manual Therapy:    10     mins  81983;  Therapeutic Exercise:    25     mins  34876;     Neuromuscular Ryne:    -    mins  27986;    Therapeutic Activity:     15     mins  71931;     Gait Training:      -      mins  29170;     Ultrasound:     -     mins  72759;    Iontophoresis                 -     mins 04943    Timed Treatment:   50   mins direct  Total Treatment:     55   mins

## 2021-05-14 ENCOUNTER — TREATMENT (OUTPATIENT)
Dept: PHYSICAL THERAPY | Facility: CLINIC | Age: 61
End: 2021-05-14

## 2021-05-14 DIAGNOSIS — Z74.09 IMPAIRED FUNCTIONAL MOBILITY, BALANCE, GAIT, AND ENDURANCE: ICD-10-CM

## 2021-05-14 DIAGNOSIS — M54.50 MIDLINE LOW BACK PAIN WITHOUT SCIATICA, UNSPECIFIED CHRONICITY: ICD-10-CM

## 2021-05-14 DIAGNOSIS — R26.2 DIFFICULTY WALKING: ICD-10-CM

## 2021-05-14 DIAGNOSIS — M25.511 CHRONIC RIGHT SHOULDER PAIN: Primary | ICD-10-CM

## 2021-05-14 DIAGNOSIS — M25.512 CHRONIC LEFT SHOULDER PAIN: ICD-10-CM

## 2021-05-14 DIAGNOSIS — G89.29 CHRONIC LEFT SHOULDER PAIN: ICD-10-CM

## 2021-05-14 DIAGNOSIS — G89.29 CHRONIC RIGHT SHOULDER PAIN: Primary | ICD-10-CM

## 2021-05-14 PROCEDURE — 97140 MANUAL THERAPY 1/> REGIONS: CPT | Performed by: PHYSICAL THERAPIST

## 2021-05-14 PROCEDURE — 97530 THERAPEUTIC ACTIVITIES: CPT | Performed by: PHYSICAL THERAPIST

## 2021-05-14 PROCEDURE — 97110 THERAPEUTIC EXERCISES: CPT | Performed by: PHYSICAL THERAPIST

## 2021-05-21 ENCOUNTER — TREATMENT (OUTPATIENT)
Dept: PHYSICAL THERAPY | Facility: CLINIC | Age: 61
End: 2021-05-21

## 2021-05-21 DIAGNOSIS — M25.511 CHRONIC RIGHT SHOULDER PAIN: Primary | ICD-10-CM

## 2021-05-21 DIAGNOSIS — G89.29 CHRONIC RIGHT SHOULDER PAIN: Primary | ICD-10-CM

## 2021-05-21 DIAGNOSIS — M25.512 CHRONIC LEFT SHOULDER PAIN: ICD-10-CM

## 2021-05-21 DIAGNOSIS — G89.29 CHRONIC LEFT SHOULDER PAIN: ICD-10-CM

## 2021-05-21 DIAGNOSIS — Z74.09 IMPAIRED FUNCTIONAL MOBILITY, BALANCE, GAIT, AND ENDURANCE: ICD-10-CM

## 2021-05-21 PROCEDURE — 97116 GAIT TRAINING THERAPY: CPT | Performed by: PHYSICAL THERAPIST

## 2021-05-21 PROCEDURE — 97530 THERAPEUTIC ACTIVITIES: CPT | Performed by: PHYSICAL THERAPIST

## 2021-05-21 PROCEDURE — 97110 THERAPEUTIC EXERCISES: CPT | Performed by: PHYSICAL THERAPIST

## 2021-05-21 NOTE — PROGRESS NOTES
Physical Therapy Daily Treatment Note      Visit # 9      Subjective   Pt states his dog scratched leg.    Objective          Observations   Left Knee   Positive for abrasion.     Additional Knee Observation Details  ~2in wound left medial lower leg, left anterior loewr leg redness        See Exercise, Manual, and Modality Logs for complete treatment.       Assessment/Plan  Recommended pt go to urgent care re: wound and LE redness.    Fine tolerance to all exercise w/o c/o pain. Has tendency to circumduct when stepping over 4 inch hurdles. Step length improving, though pt needs 2 HHA for safety when stepping over hurdles. Progress per POC.           Manual Therapy:    8     mins  06392;  Therapeutic Exercise:    15     mins  36819;     Neuromuscular Ryne:    0    mins  48889;    Therapeutic Activity:     15     mins  73216;     Gait Training:      10     mins  41119;     Ultrasound:     0     mins  57028;    Work Hardening           0      mins 94216  Iontophoresis               0   mins 11759    Timed Treatment:   48   mins   Total Treatment:     60   mins    Tanna Zaragoza, PT  Physical Therapist

## 2021-05-28 ENCOUNTER — TREATMENT (OUTPATIENT)
Dept: PHYSICAL THERAPY | Facility: CLINIC | Age: 61
End: 2021-05-28

## 2021-05-28 PROCEDURE — 97110 THERAPEUTIC EXERCISES: CPT | Performed by: PHYSICAL THERAPIST

## 2021-05-28 PROCEDURE — 97116 GAIT TRAINING THERAPY: CPT | Performed by: PHYSICAL THERAPIST

## 2021-05-28 PROCEDURE — 97530 THERAPEUTIC ACTIVITIES: CPT | Performed by: PHYSICAL THERAPIST

## 2021-05-28 NOTE — PROGRESS NOTES
Physical Therapy Daily Treatment Note      Visit # 10      Subjective   He is on anti-biotic for leg wound. No other changes. No c/o pain.    Objective   See Exercise, Manual, and Modality Logs for complete treatment.       Assessment/Plan  Excellent tolerance to all exercise w/o c/o pain. Continues to have poor single leg stability and bilateral knee valgus, impairing step length and height. Requires several seated rest breaks throughout 45 mins of exercise due to low back pain/fatigue. Progress per POC.                 Manual Therapy:    8     mins  13185;  Therapeutic Exercise:    10     mins  76400;     Neuromuscular Ryne:    0    mins  25962;    Therapeutic Activity:     15     mins  21478;     Gait Training:      10     mins  53400;     Ultrasound:     0     mins  14745;    Work Hardening           0      mins 26810  Iontophoresis               0   mins 23056    Timed Treatment:   43   mins   Total Treatment:     60   mins    Tanna Zaragoza, PT  Physical Therapist

## 2021-06-02 ENCOUNTER — TREATMENT (OUTPATIENT)
Dept: PHYSICAL THERAPY | Facility: CLINIC | Age: 61
End: 2021-06-02

## 2021-06-02 DIAGNOSIS — G89.29 CHRONIC LEFT SHOULDER PAIN: ICD-10-CM

## 2021-06-02 DIAGNOSIS — G89.29 CHRONIC RIGHT SHOULDER PAIN: Primary | ICD-10-CM

## 2021-06-02 DIAGNOSIS — M25.512 CHRONIC LEFT SHOULDER PAIN: ICD-10-CM

## 2021-06-02 DIAGNOSIS — M25.511 CHRONIC RIGHT SHOULDER PAIN: Primary | ICD-10-CM

## 2021-06-02 DIAGNOSIS — Z74.09 IMPAIRED FUNCTIONAL MOBILITY, BALANCE, GAIT, AND ENDURANCE: ICD-10-CM

## 2021-06-02 PROCEDURE — 97530 THERAPEUTIC ACTIVITIES: CPT | Performed by: PHYSICAL THERAPIST

## 2021-06-02 PROCEDURE — 97110 THERAPEUTIC EXERCISES: CPT | Performed by: PHYSICAL THERAPIST

## 2021-06-02 NOTE — PROGRESS NOTES
Physical Therapy Daily Treatment Note      Visit # 11      Subjective   No new changes. Plans to move to [place] in October.    Objective   See Exercise, Manual, and Modality Logs for complete treatment.     Assessment/Plan  Excellent tolerance to exercise program. Will continue to benefit from skilled PT to progress functional strength and endurance. Progress per POC.           Manual Therapy:    0     mins  35045;  Therapeutic Exercise:    15     mins  34273;     Neuromuscular Ryne:    0    mins  19651;    Therapeutic Activity:     25     mins  92706;     Gait Trainin     mins  24878;     Ultrasound:     0     mins  34625;    Work Hardening           0      mins 02757  Iontophoresis               0   mins 09321    Timed Treatment:   40   mins   Total Treatment:     45   mins    Tanna Zaragoza, PT  Physical Therapist

## 2021-06-04 ENCOUNTER — TREATMENT (OUTPATIENT)
Dept: PHYSICAL THERAPY | Facility: CLINIC | Age: 61
End: 2021-06-04

## 2021-06-04 DIAGNOSIS — Z74.09 IMPAIRED FUNCTIONAL MOBILITY, BALANCE, GAIT, AND ENDURANCE: ICD-10-CM

## 2021-06-04 DIAGNOSIS — G89.29 CHRONIC LEFT SHOULDER PAIN: ICD-10-CM

## 2021-06-04 DIAGNOSIS — M25.511 CHRONIC RIGHT SHOULDER PAIN: Primary | ICD-10-CM

## 2021-06-04 DIAGNOSIS — M25.512 CHRONIC LEFT SHOULDER PAIN: ICD-10-CM

## 2021-06-04 DIAGNOSIS — G89.29 CHRONIC RIGHT SHOULDER PAIN: Primary | ICD-10-CM

## 2021-06-04 PROCEDURE — 97530 THERAPEUTIC ACTIVITIES: CPT | Performed by: PHYSICAL THERAPIST

## 2021-06-04 PROCEDURE — 97110 THERAPEUTIC EXERCISES: CPT | Performed by: PHYSICAL THERAPIST

## 2021-06-04 PROCEDURE — 97116 GAIT TRAINING THERAPY: CPT | Performed by: PHYSICAL THERAPIST

## 2021-06-09 ENCOUNTER — TREATMENT (OUTPATIENT)
Dept: PHYSICAL THERAPY | Facility: CLINIC | Age: 61
End: 2021-06-09

## 2021-06-09 PROCEDURE — 97116 GAIT TRAINING THERAPY: CPT | Performed by: PHYSICAL THERAPIST

## 2021-06-09 PROCEDURE — 97530 THERAPEUTIC ACTIVITIES: CPT | Performed by: PHYSICAL THERAPIST

## 2021-06-09 PROCEDURE — 97110 THERAPEUTIC EXERCISES: CPT | Performed by: PHYSICAL THERAPIST

## 2021-06-09 NOTE — PROGRESS NOTES
Physical Therapy Daily Treatment Note      Visit # 13      Subjective   No new changes. He thinks he will have R TKA before moving to [place], but after his partner has bunion Sx.    Objective   See Exercise, Manual, and Modality Logs for complete treatment.       Assessment/Plan  Was able to perform 4 inch step down's bilaterally, but lacks eccentric control on R. Continues to have decreased gait speed and step length. Updated HEP to include quad set. Progress per POC.         Manual Therapy:    0     mins  12261;  Therapeutic Exercise:    15     mins  26893;     Neuromuscular Ryne:    0    mins  95857;    Therapeutic Activity:     15     mins  59831;     Gait Training:      10     mins  92730;     Ultrasound:     0     mins  01722;    Work Hardening           0      mins 39525  Iontophoresis               0   mins 56463    Timed Treatment:   40   mins   Total Treatment:     50   mins    Tanna Zaragoza, PT  Physical Therapist

## 2021-06-11 ENCOUNTER — TREATMENT (OUTPATIENT)
Dept: PHYSICAL THERAPY | Facility: CLINIC | Age: 61
End: 2021-06-11

## 2021-06-11 DIAGNOSIS — G89.29 CHRONIC LEFT SHOULDER PAIN: ICD-10-CM

## 2021-06-11 DIAGNOSIS — G89.29 CHRONIC RIGHT SHOULDER PAIN: Primary | ICD-10-CM

## 2021-06-11 DIAGNOSIS — M25.512 CHRONIC LEFT SHOULDER PAIN: ICD-10-CM

## 2021-06-11 DIAGNOSIS — M25.511 CHRONIC RIGHT SHOULDER PAIN: Primary | ICD-10-CM

## 2021-06-11 DIAGNOSIS — Z74.09 IMPAIRED FUNCTIONAL MOBILITY, BALANCE, GAIT, AND ENDURANCE: ICD-10-CM

## 2021-06-11 PROCEDURE — 97110 THERAPEUTIC EXERCISES: CPT | Performed by: PHYSICAL THERAPIST

## 2021-06-11 PROCEDURE — 97530 THERAPEUTIC ACTIVITIES: CPT | Performed by: PHYSICAL THERAPIST

## 2021-06-11 NOTE — PROGRESS NOTES
Physical Therapy Daily Treatment Note    Patient: Larry Walters   : 1960  Diagnosis/ICD-10 Code:  Chronic right shoulder pain [M25.511, G89.29]  Referring practitioner: Nikki Mcgarry MD  Date of Initial Evaluation:  Type: THERAPY  Noted: 2021  Visit # 14      Subjective   No new changes    Objective   See Exercise, Manual, and Modality Logs for complete treatment.       Assessment/Plan  Excellent tolerance to exercise w/o c/o pain. Progress per POC.         Manual Therapy:    0     mins  44245;  Therapeutic Exercise:    30     mins  18131;     Neuromuscular Ryne:    0    mins  25111;    Therapeutic Activity:     10     mins  61603;     Gait Trainin     mins  00763;     Ultrasound:     0     mins  32005;    Work Hardening           0      mins 00204  Iontophoresis               0   mins 29054    Timed Treatment:   40   mins   Total Treatment:     50   mins    Tanna Zaragoza PT  Physical Therapist

## 2021-06-18 ENCOUNTER — TREATMENT (OUTPATIENT)
Dept: PHYSICAL THERAPY | Facility: CLINIC | Age: 61
End: 2021-06-18

## 2021-06-18 DIAGNOSIS — M25.512 CHRONIC LEFT SHOULDER PAIN: ICD-10-CM

## 2021-06-18 DIAGNOSIS — M25.511 CHRONIC RIGHT SHOULDER PAIN: Primary | ICD-10-CM

## 2021-06-18 DIAGNOSIS — G89.29 CHRONIC RIGHT SHOULDER PAIN: Primary | ICD-10-CM

## 2021-06-18 DIAGNOSIS — Z74.09 IMPAIRED FUNCTIONAL MOBILITY, BALANCE, GAIT, AND ENDURANCE: ICD-10-CM

## 2021-06-18 DIAGNOSIS — G89.29 CHRONIC LEFT SHOULDER PAIN: ICD-10-CM

## 2021-06-18 PROCEDURE — 97110 THERAPEUTIC EXERCISES: CPT | Performed by: PHYSICAL THERAPIST

## 2021-06-18 PROCEDURE — 97530 THERAPEUTIC ACTIVITIES: CPT | Performed by: PHYSICAL THERAPIST

## 2021-06-18 NOTE — PROGRESS NOTES
Re-Assessment / Re-Certification        Patient: Larry Walters   : 1960  Diagnosis/ICD-10 Code:  Chronic right shoulder pain [M25.511, G89.29]  Referring practitioner: Nikki Mcgarry MD  Date of Initial Evaluation:  Type: THERAPY  Noted: 2021  Patient seen for 15 sessions      Subjective:   Larry Walters reports: I feel good. I want to work on stride length and balance. Shoulders are sore if I stretch too much. Shoulder motion is still limited. I have a hard time putting dishes on top shelf of cabinet.    Subjective Questionnaire: QuickDASH: 31.8  LEFS: 45  Clinical Progress: unchanged  Home Program Compliance: Yes  Treatment has included: therapeutic exercise, neuromuscular re-education, manual therapy, therapeutic activity and gait training    Subjective   Objective          Active Range of Motion   Left Shoulder   Flexion: 118 degrees   Abduction: 117 degrees with pain  External rotation BTH: Active external rotation behind the head: Behind head. with pain  Internal rotation BTB: L5 with pain    Right Shoulder   Flexion: 105 degrees   Abduction: 76 degrees with pain  External rotation BTH: Active external rotation behind the head: Behind head. with pain  Internal rotation BTB: L5 with pain    Strength/Myotome Testing     Left Shoulder     Planes of Motion   Flexion: 5   Abduction: 4   External rotation at 0°: 4+   Internal rotation at 0°: 4+     Right Shoulder     Planes of Motion   Flexion: 5   Abduction: 4   External rotation at 0°: 4+   Internal rotation at 0°: 4+     Functional Assessment     Comments  5x STS: 33s  TUs     Goals est 21   Short Term Goals: 4 weeks. Patient will:  1. Be independent with initial HEP  2. Demonstrate >10deg improvement in shoulder AROM in all planes - UNMET  3. Demonstrate safe independent ambulation in clinic w/o AD - MET    Long Term Goals: 8-12 weeks. Patient will:  1. Demonstrate improved Bilateral upper/lower extremity MMT of >/= 4+/5 to allow for performance of  daily activities without pain. - UNMET  2. Demonstrate upper extremity AROM WFL to allow for return to household & recreational activities w/o increased symptoms - UNMET  3. Score w/in age matched norms for 5x STS and TUG functional testing demonstrating improved gait speed and endurance - UNMET  4. Perceived disability </= 10% as measured by LEFS and Quick DASH - UNMET  5. Be independent with long term HEP - UNMET    Assessment/Plan  Progress toward previous goals: Partially Met    No significant improvement in shoulder AROM, strength, or functional tests (5x STS and TUG) since assessment 21. However, pt reports less pain and difficulty with ADLs. Continues to have decreased step length, decreased gait speed, impaired balance, and limited shoulder AROM bilaterally. Will continue to benefit from skilled PT to improve safe, independent ambulation and to improve functional shoulder AROM.        Recommendations: Continue as planned  Timeframe: 3 months  Prognosis to achieve goals: fair    PT Signature: Tanna Zaragoza PT      Based upon review of the patient's progress and continued therapy plan, it is my medical opinion that Larry Walters should continue physical therapy treatment at [facility] PHYSICAL THERAPY  [address] [place] [id number] [phone].    Signature: __________________________________  Nikki Mcgarry MD    Manual Therapy:    0     mins  10159;  Therapeutic Exercise:    30     mins  96491;     Neuromuscular Ryne:    0    mins  74855;    Therapeutic Activity:     15     mins  32127;     Gait Trainin     mins  66069;     Ultrasound:     0     mins  26713;    Work Hardening           0      mins 55003  Iontophoresis               0   mins 86517    Timed Treatment:   45   mins   Total Treatment:     60   mins

## 2021-06-23 ENCOUNTER — TREATMENT (OUTPATIENT)
Dept: PHYSICAL THERAPY | Facility: CLINIC | Age: 61
End: 2021-06-23

## 2021-06-23 DIAGNOSIS — M25.512 CHRONIC LEFT SHOULDER PAIN: ICD-10-CM

## 2021-06-23 DIAGNOSIS — M25.511 CHRONIC RIGHT SHOULDER PAIN: Primary | ICD-10-CM

## 2021-06-23 DIAGNOSIS — Z74.09 IMPAIRED FUNCTIONAL MOBILITY, BALANCE, GAIT, AND ENDURANCE: ICD-10-CM

## 2021-06-23 DIAGNOSIS — G89.29 CHRONIC LEFT SHOULDER PAIN: ICD-10-CM

## 2021-06-23 DIAGNOSIS — G89.29 CHRONIC RIGHT SHOULDER PAIN: Primary | ICD-10-CM

## 2021-06-23 PROCEDURE — 97530 THERAPEUTIC ACTIVITIES: CPT | Performed by: PHYSICAL THERAPIST

## 2021-06-23 PROCEDURE — 97110 THERAPEUTIC EXERCISES: CPT | Performed by: PHYSICAL THERAPIST

## 2021-06-23 NOTE — PROGRESS NOTES
Physical Therapy Daily Treatment Note    Patient: Larry Walters   : 1960  Diagnosis/ICD-10 Code:  No primary diagnosis found.  Referring practitioner: Nikki Mcgarry MD  Date of Initial Evaluation:  No linked episodes  Visit # Visit count could not be calculated. Make sure you are using a visit which is associated with an episode.      Subjective   No new changes.    Objective   See Exercise, Manual, and Modality Logs for complete treatment.       Assessment/Plan  Demonstrated slight improvements in AAROM shoulder flexion and step length during exercise. Progress per POC.                 Manual Therapy:    0     mins  94928;  Therapeutic Exercise:    25     mins  42926;     Neuromuscular Ryne:    0    mins  67508;    Therapeutic Activity:     20     mins  28394;     Gait Trainin     mins  37751;     Ultrasound:     0     mins  83633;    Work Hardening           0      mins 80348  Iontophoresis               0   mins 07359    Timed Treatment:   45   mins   Total Treatment:     55   mins    Tanna Zaragoza PT  Physical Therapist

## 2021-07-08 ENCOUNTER — TREATMENT (OUTPATIENT)
Dept: PHYSICAL THERAPY | Facility: CLINIC | Age: 61
End: 2021-07-08

## 2021-07-08 DIAGNOSIS — M25.511 CHRONIC RIGHT SHOULDER PAIN: Primary | ICD-10-CM

## 2021-07-08 DIAGNOSIS — Z74.09 IMPAIRED FUNCTIONAL MOBILITY, BALANCE, GAIT, AND ENDURANCE: ICD-10-CM

## 2021-07-08 DIAGNOSIS — G89.29 CHRONIC RIGHT SHOULDER PAIN: Primary | ICD-10-CM

## 2021-07-08 DIAGNOSIS — M25.512 CHRONIC LEFT SHOULDER PAIN: ICD-10-CM

## 2021-07-08 DIAGNOSIS — G89.29 CHRONIC LEFT SHOULDER PAIN: ICD-10-CM

## 2021-07-08 PROCEDURE — 97110 THERAPEUTIC EXERCISES: CPT | Performed by: PHYSICAL THERAPIST

## 2021-07-08 PROCEDURE — 97530 THERAPEUTIC ACTIVITIES: CPT | Performed by: PHYSICAL THERAPIST

## 2021-07-08 NOTE — PROGRESS NOTES
Physical Therapy Daily Treatment Note    Patient: Larry Walters   : 1960  Diagnosis/ICD-10 Code:  Chronic right shoulder pain [M25.511, G89.29]  Referring practitioner: Nikki Mcgarry MD  Date of Initial Evaluation:  Type: THERAPY  Noted: 2021  Visit # 17      Subjective   No new changes. Shoulders and knees feel pretty good.    Objective   See Exercise, Manual, and Modality Logs for complete treatment.       Assessment/Plan  Excellent tolerance to all exercise w/o c/o pain. Still requires SBA when ambulatory due to fall risk. Continues to have decreased L shoulder strength as compared to R.  Progress per POC.      Manual Therapy:    0     mins  47527;  Therapeutic Exercise:    30     mins  47471;     Neuromuscular Ryne:    0    mins  91824;    Therapeutic Activity:     15     mins  52321;     Gait Trainin     mins  34234;     Ultrasound:     0     mins  47387;    Work Hardening           0      mins 04111  Iontophoresis               0   mins 63921    Timed Treatment:   45   mins   Total Treatment:     50   mins    Tanna Zaragoza, PT  Physical Therapist

## 2021-07-15 ENCOUNTER — TREATMENT (OUTPATIENT)
Dept: PHYSICAL THERAPY | Facility: CLINIC | Age: 61
End: 2021-07-15

## 2021-07-15 DIAGNOSIS — G89.29 CHRONIC LEFT SHOULDER PAIN: ICD-10-CM

## 2021-07-15 DIAGNOSIS — M25.511 CHRONIC RIGHT SHOULDER PAIN: Primary | ICD-10-CM

## 2021-07-15 DIAGNOSIS — G89.29 CHRONIC RIGHT SHOULDER PAIN: Primary | ICD-10-CM

## 2021-07-15 DIAGNOSIS — Z74.09 IMPAIRED FUNCTIONAL MOBILITY, BALANCE, GAIT, AND ENDURANCE: ICD-10-CM

## 2021-07-15 DIAGNOSIS — M25.512 CHRONIC LEFT SHOULDER PAIN: ICD-10-CM

## 2021-07-15 PROCEDURE — 97110 THERAPEUTIC EXERCISES: CPT | Performed by: PHYSICAL THERAPIST

## 2021-07-15 PROCEDURE — 97530 THERAPEUTIC ACTIVITIES: CPT | Performed by: PHYSICAL THERAPIST

## 2021-07-15 NOTE — PROGRESS NOTES
Physical Therapy Daily Treatment Note    Patient: Larry Walters   : 1960  Diagnosis/ICD-10 Code:  Chronic right shoulder pain [M25.511, G89.29]  Referring practitioner: Nikki Mcgarry MD  Date of Initial Evaluation:  Type: THERAPY  Noted: 2021  Visit # 18      Subjective   No complaints of pain.    Objective   See Exercise, Manual, and Modality Logs for complete treatment.       Assessment/Plan  Excellent tolerance to all exercise w/o c/o pain. Was able to complete session without seated rest break for the first time showing improved standing endurance. Progress per POC.                 Manual Therapy:    0     mins  46090;  Therapeutic Exercise:    30     mins  59258;     Neuromuscular Ryne:    0    mins  89819;    Therapeutic Activity:     15     mins  56221;     Gait Trainin     mins  81813;     Ultrasound:     0     mins  72410;    Work Hardening           0      mins 55367  Iontophoresis               0   mins 59425    Timed Treatment:   45   mins   Total Treatment:     50   mins    Tanna Zaragoza PT  Physical Therapist

## 2021-07-22 ENCOUNTER — TREATMENT (OUTPATIENT)
Dept: PHYSICAL THERAPY | Facility: CLINIC | Age: 61
End: 2021-07-22

## 2021-07-22 DIAGNOSIS — M25.511 CHRONIC RIGHT SHOULDER PAIN: Primary | ICD-10-CM

## 2021-07-22 DIAGNOSIS — M25.512 CHRONIC LEFT SHOULDER PAIN: ICD-10-CM

## 2021-07-22 DIAGNOSIS — Z74.09 IMPAIRED FUNCTIONAL MOBILITY, BALANCE, GAIT, AND ENDURANCE: ICD-10-CM

## 2021-07-22 DIAGNOSIS — G89.29 CHRONIC LEFT SHOULDER PAIN: ICD-10-CM

## 2021-07-22 DIAGNOSIS — G89.29 CHRONIC RIGHT SHOULDER PAIN: Primary | ICD-10-CM

## 2021-07-22 PROCEDURE — 97110 THERAPEUTIC EXERCISES: CPT | Performed by: PHYSICAL THERAPIST

## 2021-07-22 PROCEDURE — 97530 THERAPEUTIC ACTIVITIES: CPT | Performed by: PHYSICAL THERAPIST

## 2021-07-22 NOTE — PROGRESS NOTES
Re-Assessment / Re-Certification        Patient: Larry Walters   : 1960  Diagnosis/ICD-10 Code:  Chronic right shoulder pain [M25.511, G89.29]  Referring practitioner: Nikki Mcgarry MD  Date of Initial Evaluation:  Type: THERAPY  Noted: 2021  Patient seen for 19 sessions      Subjective:   Larry Walters reports: Fell out of bed Monday. Was reaching for shoes, sitting, feet slipped, and landed with butt on floor. Hit head on tand. Could not get up on his own. Called fire dept who gave a thorough exam. He did not feel need to go to ER.  Feeling good otherwise. Shoulders are a little sore. Currently 0/10. As the day goes on, pain increases to 9-10/10. Pain relieved to 0/10 with ibuprofen. Takes 800mg ibuprofen morning & night.    Subjective Questionnaire: QuickDASH: 47.73 vs 31.82 21  LEFS: 43/80 vs 45/80  Clinical Progress: Improved  Home Program Compliance: Yes  Treatment has included: therapeutic exercise, neuromuscular re-education, therapeutic activity and gait training    Subjective   Objective   Active Range of Motion   Left Shoulder   Flexion: 132 degrees   Abduction: 114 degrees with pain  External rotation BTH: Active external rotation behind the head: Behind head.   Internal rotation BTB: L1 with pain    Right Shoulder   Flexion: 101 degrees with pain  Abduction: 78 degrees with pain  External rotation BTH: Active external rotation behind the head: Behind head.   Internal rotation BTB: L1 with pain     Strength/Myotome Testing     Left Shoulder      Planes of Motion   Flexion: 5   Abduction: 4   External rotation at 0°: 4+   Internal rotation at 0°: 5     Right Shoulder      Planes of Motion   Flexion: 5   Abduction: 5  External rotation at 0°: 5   Internal rotation at 0°: 5      Functional Assessment      Comments  5x STS: 34s vs 33s 21  TUs vs 25s 21     Goals est 21   Short Term Goals: 4 weeks. Patient will:  1. Be independent with initial HEP MET  2. Demonstrate >10deg  improvement in shoulder AROM in all planes  NOT MET  3. Demonstrate safe independent ambulation in clinic w/o AD PROGRESSING    Long Term Goals: 8-12 weeks. Patient will:  1. Demonstrate improved Bilateral upper/lower extremity MMT of >/= 4+/5 to allow for performance of daily activities without pain. PROGRESSING  2. Demonstrate upper extremity AROM WFL to allow for return to household & recreational activities w/o increased symptoms  NOT MET  3. Score w/in age matched norms for 5x STS and TUG functional testing demonstrating improved gait speed and endurance NOT MET  4. Perceived disability </= 10% as measured by LEFS and Quick DASH NOT MET  5. Be independent with long term HEP PROGRESSING    Assessment/Plan  Progress toward previous goals: Partially Met    Improved subjective report of UE function, UE strength, and score on TUG. Shoulder AROM and 5x STS unchanged. Continues to shuffle with decreased gait speed and had recent fall sitting at end of bed at home. Will continue to benefit from skilled PT to address unmet goals and decrease risk for fall/injury.        Recommendations: Continue with recommendations 2-3x/week  Timeframe: 3 months  Prognosis to achieve goals: fair    PT Signature: Tanna Zaragoza PT      Based upon review of the patient's progress and continued therapy plan, it is my medical opinion that Larry Walters should continue physical therapy treatment at [facility] PHYSICAL THERAPY  [address] [place] [address] [phone].    Signature: __________________________________  Nikki Mcgarry MD    Manual Therapy:    0     mins  64589;  Therapeutic Exercise:    25     mins  68078;     Neuromuscular Ryne:    0    mins  06994;    Therapeutic Activity:     30     mins  28925;     Gait Trainin     mins  15971;     Ultrasound:     0     mins  03554;    Work Hardening           0      mins 67134  Iontophoresis               0   mins 22118    Timed  Treatment:   55   mins   Total Treatment:     65   mins

## 2021-08-05 ENCOUNTER — TREATMENT (OUTPATIENT)
Dept: PHYSICAL THERAPY | Facility: CLINIC | Age: 61
End: 2021-08-05

## 2021-08-05 DIAGNOSIS — M25.512 CHRONIC LEFT SHOULDER PAIN: ICD-10-CM

## 2021-08-05 DIAGNOSIS — G89.29 CHRONIC LEFT SHOULDER PAIN: ICD-10-CM

## 2021-08-05 DIAGNOSIS — M25.511 CHRONIC RIGHT SHOULDER PAIN: Primary | ICD-10-CM

## 2021-08-05 DIAGNOSIS — Z74.09 IMPAIRED FUNCTIONAL MOBILITY, BALANCE, GAIT, AND ENDURANCE: ICD-10-CM

## 2021-08-05 DIAGNOSIS — R26.2 DIFFICULTY WALKING: ICD-10-CM

## 2021-08-05 DIAGNOSIS — G89.29 CHRONIC RIGHT SHOULDER PAIN: Primary | ICD-10-CM

## 2021-08-05 DIAGNOSIS — M54.50 MIDLINE LOW BACK PAIN WITHOUT SCIATICA, UNSPECIFIED CHRONICITY: ICD-10-CM

## 2021-08-05 PROCEDURE — 97530 THERAPEUTIC ACTIVITIES: CPT | Performed by: PHYSICAL THERAPIST

## 2021-08-05 PROCEDURE — 97110 THERAPEUTIC EXERCISES: CPT | Performed by: PHYSICAL THERAPIST

## 2021-08-05 NOTE — PROGRESS NOTES
Physical Therapy Daily Treatment Note    Patient: Larry Waltesr   : 1960  Diagnosis/ICD-10 Code:  Chronic right shoulder pain [M25.511, G89.29]  Referring practitioner: Nikki Mcgarry MD  Date of Initial Evaluation:  Type: THERAPY  Noted: 2021  Visit # 20      Subjective   No new changes.    Objective   See Exercise, Manual, and Modality Logs for complete treatment.       Assessment/Plan  Limited ability to support BW on R LE continues to affect gait. L UE weakness (compared to R) evident during exercise. Progress per POC.           Manual Therapy:    0     mins  02311;  Therapeutic Exercise:    30     mins  68633;     Neuromuscular Ryne:    0    mins  18745;    Therapeutic Activity:     15     mins  30171;     Gait Trainin     mins  03507;     Ultrasound:     0     mins  49009;    Work Hardening           0      mins 00775  Iontophoresis               0   mins 84257    Timed Treatment:   45   mins   Total Treatment:     60   mins    Tanna Zaragoza, PT  Physical Therapist

## 2021-08-12 ENCOUNTER — TREATMENT (OUTPATIENT)
Dept: PHYSICAL THERAPY | Facility: CLINIC | Age: 61
End: 2021-08-12

## 2021-08-12 DIAGNOSIS — M25.511 CHRONIC RIGHT SHOULDER PAIN: Primary | ICD-10-CM

## 2021-08-12 DIAGNOSIS — G89.29 CHRONIC RIGHT SHOULDER PAIN: Primary | ICD-10-CM

## 2021-08-12 DIAGNOSIS — M54.50 MIDLINE LOW BACK PAIN WITHOUT SCIATICA, UNSPECIFIED CHRONICITY: ICD-10-CM

## 2021-08-12 DIAGNOSIS — R26.2 DIFFICULTY WALKING: ICD-10-CM

## 2021-08-12 DIAGNOSIS — M25.512 CHRONIC LEFT SHOULDER PAIN: ICD-10-CM

## 2021-08-12 DIAGNOSIS — Z74.09 IMPAIRED FUNCTIONAL MOBILITY, BALANCE, GAIT, AND ENDURANCE: ICD-10-CM

## 2021-08-12 DIAGNOSIS — G89.29 CHRONIC LEFT SHOULDER PAIN: ICD-10-CM

## 2021-08-12 PROCEDURE — 97530 THERAPEUTIC ACTIVITIES: CPT | Performed by: PHYSICAL THERAPIST

## 2021-08-12 PROCEDURE — 97110 THERAPEUTIC EXERCISES: CPT | Performed by: PHYSICAL THERAPIST

## 2021-08-12 NOTE — PROGRESS NOTES
Physical Therapy Daily Treatment Note    Patient: Larry Walters   : 1960  Diagnosis/ICD-10 Code:  Chronic right shoulder pain [M25.511, G89.29]  Referring practitioner: Nikki Mcgarry MD  Date of Initial Evaluation:  Type: THERAPY  Noted: 2021  Visit # 21      Subjective   No changes. Enjoyed working on steps last visit.    Objective   See Exercise, Manual, and Modality Logs for complete treatment.       Assessment/Plan  Was able to perform >10 mins standing activity w/o need for seated rest break. No c/o pain w/ step ups using 2 rails. Progress per POC.           Manual Therapy:    0     mins  17601;  Therapeutic Exercise:    30     mins  96035;     Neuromuscular Ryne:    0    mins  50554;    Therapeutic Activity:     15     mins  14099;     Gait Trainin     mins  10147;     Ultrasound:     0     mins  71694;    Work Hardening           0      mins 13913  Iontophoresis               0   mins 39589    Timed Treatment:   45   mins   Total Treatment:     60   mins    Tanna Zaragoza PT  Physical Therapist

## 2021-08-19 ENCOUNTER — TREATMENT (OUTPATIENT)
Dept: PHYSICAL THERAPY | Facility: CLINIC | Age: 61
End: 2021-08-19

## 2021-08-19 DIAGNOSIS — G89.29 CHRONIC LEFT SHOULDER PAIN: ICD-10-CM

## 2021-08-19 DIAGNOSIS — G89.29 CHRONIC RIGHT SHOULDER PAIN: Primary | ICD-10-CM

## 2021-08-19 DIAGNOSIS — M25.511 CHRONIC RIGHT SHOULDER PAIN: Primary | ICD-10-CM

## 2021-08-19 DIAGNOSIS — M54.50 MIDLINE LOW BACK PAIN WITHOUT SCIATICA, UNSPECIFIED CHRONICITY: ICD-10-CM

## 2021-08-19 DIAGNOSIS — M25.512 CHRONIC LEFT SHOULDER PAIN: ICD-10-CM

## 2021-08-19 DIAGNOSIS — R26.2 DIFFICULTY WALKING: ICD-10-CM

## 2021-08-19 DIAGNOSIS — Z74.09 IMPAIRED FUNCTIONAL MOBILITY, BALANCE, GAIT, AND ENDURANCE: ICD-10-CM

## 2021-08-19 PROCEDURE — 97530 THERAPEUTIC ACTIVITIES: CPT | Performed by: PHYSICAL THERAPIST

## 2021-08-19 PROCEDURE — 97110 THERAPEUTIC EXERCISES: CPT | Performed by: PHYSICAL THERAPIST

## 2021-08-19 NOTE — PROGRESS NOTES
Physical Therapy Daily Treatment Note    Patient: Larry Walters   : 1960  Diagnosis/ICD-10 Code:  Chronic right shoulder pain [M25.511, G89.29]  Referring practitioner: Nikki Mcgarry MD  Date of Initial Evaluation:  Type: THERAPY  Noted: 2021  Visit # 22      Subjective   No new changes.    Objective   See Exercise, Manual, and Modality Logs for complete treatment.       Assessment/Plan  R knee pain when descending stairs using reciprocal pattern. Excellent tolerance to all other exercise. Progress per POC.           Manual Therapy:    0     mins  25948;  Therapeutic Exercise:    25     mins  28190;     Neuromuscular Ryne:    0    mins  47406;    Therapeutic Activity:     15     mins  14265;     Gait Trainin     mins  10108;     Ultrasound:     0     mins  14099;    Work Hardening           0      mins 36909  Iontophoresis               0   mins 51046    Timed Treatment:   40   mins   Total Treatment:     50   mins    Tanna Zaragoza PT  Physical Therapist

## 2021-08-26 ENCOUNTER — TREATMENT (OUTPATIENT)
Dept: PHYSICAL THERAPY | Facility: CLINIC | Age: 61
End: 2021-08-26

## 2021-08-26 DIAGNOSIS — G89.29 CHRONIC LEFT SHOULDER PAIN: ICD-10-CM

## 2021-08-26 DIAGNOSIS — G89.29 CHRONIC RIGHT SHOULDER PAIN: Primary | ICD-10-CM

## 2021-08-26 DIAGNOSIS — Z74.09 IMPAIRED FUNCTIONAL MOBILITY, BALANCE, GAIT, AND ENDURANCE: ICD-10-CM

## 2021-08-26 DIAGNOSIS — M25.512 CHRONIC LEFT SHOULDER PAIN: ICD-10-CM

## 2021-08-26 DIAGNOSIS — M25.511 CHRONIC RIGHT SHOULDER PAIN: Primary | ICD-10-CM

## 2021-08-26 PROCEDURE — 97530 THERAPEUTIC ACTIVITIES: CPT | Performed by: PHYSICAL THERAPIST

## 2021-08-26 PROCEDURE — 97110 THERAPEUTIC EXERCISES: CPT | Performed by: PHYSICAL THERAPIST

## 2021-08-26 NOTE — PROGRESS NOTES
Re-Assessment / Re-Certification        Patient: Larry Walters   : 1960  Diagnosis/ICD-10 Code:  No primary diagnosis found.  Referring practitioner: Nikki Mcgarry MD  Date of Initial Evaluation:  No linked episodes  Patient seen for Visit count could not be calculated. Make sure you are using a visit which is associated with an episode. sessions      Subjective:   Larry Walters reports: I think my stride and balance are better. I can walk around house and get in/out bed without losing balance. My shoulders are still a little tight. No problems with knees. I have pain in shoulders R>L w/ prolonged positioning at night. Rates shoulder pain 9/10 in AM. No pain currently.    Subjective Questionnaire: QuickDASH: 45 vs 47 21  LEFS: 39/80 vs 43/80 21  Clinical Progress: Reports improvement; objective scores unchanged  Home Program Compliance: Some  Treatment has included:therapeutic exercise, neuromuscular re-education, therapeutic activity and gait training     Subjective   Objective      Active Range of Motion   Left Shoulder   Flexion: 124  Abduction: 123, P! Base of neck  External rotation BTH: Base of occiput  Internal rotation BTB: T12    Right Shoulder   Flexion: 102  Abduction: 75  External rotation BTH: Base of occiput  Internal rotation BTB: L1, P! R shoulder     Strength/Myotome Testing     Left Shoulder      Planes of Motion   Flexion: 5   Abduction: 5   External rotation at 0°: 4+   Internal rotation at 0°: 5     Right Shoulder      Planes of Motion   Flexion: 5   Abduction: 5  External rotation at 0°: 5   Internal rotation at 0°: 5      Functional Assessment      Comments  5x STS: 30s vs 33s 21  TUs vs 25s 21     Goals est 21   Short Term Goals: 4 weeks. Patient will:  1. Be independent with initial HEP MET  2. Demonstrate >10deg improvement in shoulder AROM in all planes  NOT MET  3. Demonstrate safe independent ambulation in clinic w/o AD PROGRESSING    Long Term Goals: 8-12  weeks. Patient will:  1. Demonstrate improved Bilateral upper/lower extremity MMT of >/= 4+/5 to allow for performance of daily activities without pain. MET  2. Demonstrate upper extremity AROM WFL to allow for return to household & recreational activities w/o increased symptoms  NOT MET  3. Score w/in age matched norms for 5x STS and TUG functional testing demonstrating improved gait speed and endurance NOT MET  4. Perceived disability </= 10% as measured by LEFS and Quick DASH NOT MET  5. Be independent with long term HEP PROGRESSING    Assessment/Plan  Progress toward previous goals: Partially Met    Continues to have limited shoulder motion, right more affected than left, but reported less pain w/ AROM assessment. No significant improvement in self-reported functional scales, 5x STS, or TUG. Pt requires supervision w/ all ambulatory exercise due to fall risk. Will continue to benefit from skilled PT to optimize function and independence w/ ADLs safely.    Recommendations: Continue as planned  Timeframe: 3 months  Prognosis to achieve goals: fair    PT Signature: Tanna Zaragoza PT      Based upon review of the patient's progress and continued therapy plan, it is my medical opinion that Larry Walters should continue physical therapy treatment at [facility] PHYSICAL THERAPY  [address] [place] [id number] [phone].    Signature: __________________________________  Nikki Mcgarry MD    Manual Therapy:    0     mins  20748;  Therapeutic Exercise:    30     mins  78950;     Neuromuscular Ryne:    0    mins  02973;    Therapeutic Activity:     15     mins  44370;     Gait Trainin     mins  45847;     Ultrasound:     0     mins  09935;    Work Hardening           0      mins 47658  Iontophoresis               0   mins 78695    Timed Treatment:   45   mins   Total Treatment:     45   mins

## 2021-09-02 ENCOUNTER — TREATMENT (OUTPATIENT)
Dept: PHYSICAL THERAPY | Facility: CLINIC | Age: 61
End: 2021-09-02

## 2021-09-02 DIAGNOSIS — G89.29 CHRONIC LEFT SHOULDER PAIN: ICD-10-CM

## 2021-09-02 DIAGNOSIS — M25.511 CHRONIC RIGHT SHOULDER PAIN: Primary | ICD-10-CM

## 2021-09-02 DIAGNOSIS — M25.512 CHRONIC LEFT SHOULDER PAIN: ICD-10-CM

## 2021-09-02 DIAGNOSIS — G89.29 CHRONIC RIGHT SHOULDER PAIN: Primary | ICD-10-CM

## 2021-09-02 DIAGNOSIS — Z74.09 IMPAIRED FUNCTIONAL MOBILITY, BALANCE, GAIT, AND ENDURANCE: ICD-10-CM

## 2021-09-02 PROCEDURE — 97530 THERAPEUTIC ACTIVITIES: CPT | Performed by: PHYSICAL THERAPIST

## 2021-09-02 PROCEDURE — 97110 THERAPEUTIC EXERCISES: CPT | Performed by: PHYSICAL THERAPIST

## 2021-09-02 NOTE — PROGRESS NOTES
Physical Therapy Daily Treatment Note    Patient: Larry Walters   : 1960  Diagnosis/ICD-10 Code:  Chronic right shoulder pain [M25.511, G89.29]  Referring practitioner: Nikki Mcgarry MD  Date of Initial Evaluation:  Type: THERAPY  Noted: 2021  Visit # 23      Subjective   No new changes.    Objective   See Exercise, Manual, and Modality Logs for complete treatment.       Assessment/Plan  Excellent tolerence to all exercise w/o c/o pain. Updated HEP with SLR, seated quad sets, standing wall slides, and seated band horizontal abd.    Progress per POC.         Manual Therapy:    0     mins  93448;  Therapeutic Exercise:    30     mins  55137;     Neuromuscular Ryne:    0    mins  37323;    Therapeutic Activity:     10     mins  54261;     Gait Trainin    mins  81778;     Ultrasound:     0     mins  89971;    Work Hardening           0      mins 76511  Iontophoresis               0   mins 65675    Timed Treatment:   45   mins   Total Treatment:     55   mins    Tanna Zaragoza PT  Physical Therapist

## 2021-09-09 ENCOUNTER — TREATMENT (OUTPATIENT)
Dept: PHYSICAL THERAPY | Facility: CLINIC | Age: 61
End: 2021-09-09

## 2021-09-09 DIAGNOSIS — G89.29 CHRONIC RIGHT SHOULDER PAIN: Primary | ICD-10-CM

## 2021-09-09 DIAGNOSIS — Z74.09 IMPAIRED FUNCTIONAL MOBILITY, BALANCE, GAIT, AND ENDURANCE: ICD-10-CM

## 2021-09-09 DIAGNOSIS — M25.512 CHRONIC LEFT SHOULDER PAIN: ICD-10-CM

## 2021-09-09 DIAGNOSIS — M25.511 CHRONIC RIGHT SHOULDER PAIN: Primary | ICD-10-CM

## 2021-09-09 DIAGNOSIS — G89.29 CHRONIC LEFT SHOULDER PAIN: ICD-10-CM

## 2021-09-09 PROCEDURE — 97530 THERAPEUTIC ACTIVITIES: CPT | Performed by: PHYSICAL THERAPIST

## 2021-09-09 PROCEDURE — 97110 THERAPEUTIC EXERCISES: CPT | Performed by: PHYSICAL THERAPIST

## 2021-09-09 NOTE — PROGRESS NOTES
Physical Therapy Daily Treatment Note    Patient: Larry Walters   : 1960  Diagnosis/ICD-10 Code:  Chronic right shoulder pain [M25.511, G89.29]  Referring practitioner: Nikki Mcgarry MD  Date of Initial Evaluation:  Type: THERAPY  Noted: 2021  Visit # 24      Subjective   R shoulder stiff and sore when I wake up in the morning.     Objective   See Exercise, Manual, and Modality Logs for complete treatment.       Assessment/Plan  Excellent tolerance to UE mobility and strengthening w/o c/o pain. R knee continues to impair ADLs. Reasonably compensates when performing sit to stand and on stairs. Progress per POC.           Manual Therapy:    0     mins  17426;  Therapeutic Exercise:    25     mins  08284;     Neuromuscular Ryne:    0    mins  92412;    Therapeutic Activity:     15     mins  54326;     Gait Trainin     mins  58080;     Ultrasound:     0     mins  75083;    Work Hardening           0      mins 63872  Iontophoresis               0   mins 79799    Timed Treatment:   40   mins   Total Treatment:     45   mins    Tanna Zaragoza PT  Physical Therapist

## 2021-09-30 ENCOUNTER — TREATMENT (OUTPATIENT)
Dept: PHYSICAL THERAPY | Facility: CLINIC | Age: 61
End: 2021-09-30

## 2021-09-30 DIAGNOSIS — G89.29 CHRONIC RIGHT SHOULDER PAIN: Primary | ICD-10-CM

## 2021-09-30 DIAGNOSIS — G89.29 CHRONIC LEFT SHOULDER PAIN: ICD-10-CM

## 2021-09-30 DIAGNOSIS — Z74.09 IMPAIRED FUNCTIONAL MOBILITY, BALANCE, GAIT, AND ENDURANCE: ICD-10-CM

## 2021-09-30 DIAGNOSIS — M25.512 CHRONIC LEFT SHOULDER PAIN: ICD-10-CM

## 2021-09-30 DIAGNOSIS — M25.511 CHRONIC RIGHT SHOULDER PAIN: Primary | ICD-10-CM

## 2021-09-30 PROCEDURE — 97116 GAIT TRAINING THERAPY: CPT | Performed by: PHYSICAL THERAPIST

## 2021-09-30 PROCEDURE — 97530 THERAPEUTIC ACTIVITIES: CPT | Performed by: PHYSICAL THERAPIST

## 2021-09-30 PROCEDURE — 97110 THERAPEUTIC EXERCISES: CPT | Performed by: PHYSICAL THERAPIST

## 2021-09-30 NOTE — PROGRESS NOTES
Re-Assessment / Re-Certification        Patient: Larry Walters   : 1960  Diagnosis/ICD-10 Code:  Chronic right shoulder pain [M25.511, G89.29]  Referring practitioner: Nikki Mcgarry MD  Date of Initial Evaluation:  Type: THERAPY  Noted: 2021  Patient seen for 25 sessions      Subjective:   Larry Walters reports: R shoulder is bothering me a little bit. Pain first thing in the AM and again at night. Pain alleviated with ibuprofen. Rest of my body feels good. Balance is improving. No falls. Goal is to improve balance and gait.  Subjective Questionnaire: QuickDASH: 43/100  LEFS: 42/80  Clinical Progress: unchanged  Home Program Compliance: 5x/week  Treatment has included: therapeutic exercise, neuromuscular re-education, therapeutic activity and gait training     Subjective   Objective      Active Range of Motion   Left Shoulder   Flexion: 132  Abduction: 124  External rotation BTH: Base of occiput  Internal rotation BTB: T10    Right Shoulder   Flexion: 100  Abduction: 80  External rotation BTH: Base of occiput  Internal rotation BTB: L1     Strength/Myotome Testing     Left Shoulder      Planes of Motion   Flexion: 5   Abduction: 5   External rotation at 0°: 5   Internal rotation at 0°: 5     Right Shoulder      Planes of Motion   Flexion: 5   Abduction: 5  External rotation at 0°: 5   Internal rotation at 0°: 5      Functional Assessment      Comments  5x STS: 32s (No significant change)  TUs (No significant change)    Goals est 21   Short Term Goals: 4 weeks. Patient will:  1. Be independent with initial HEP MET  2. Demonstrate >10deg improvement in shoulder AROM in all planes  NOT MET  3. Demonstrate safe independent ambulation in clinic w/o AD MET    Long Term Goals: 8-12 weeks. Patient will:  1. Demonstrate improved Bilateral upper/lower extremity MMT of >/= 4+/5 to allow for performance of daily activities without pain. MET  2. Demonstrate upper extremity AROM WFL to allow for return to household  & recreational activities w/o increased symptoms  NOT MET  3. Score w/in age matched norms for 5x STS and TUG functional testing demonstrating improved gait speed and endurance NOT MET  4. Perceived disability </= 10% as measured by LEFS and Quick DASH NOT MET  5. Be independent with long term HEP PROGRESSING    Assessment/Plan  Progress toward previous goals: Partially Met    No significant improvement in functional test scores or shoulder AROM. Minimal c/o pain. Pt can do a few exercises at home but  requires verbal cuing and supervision in order to perform full exercise regimen. Will continue to benefit from skilled PT to improve activity tolerance, functional AROM, and gait speed/stability.      Recommendations: Continue with recommendations 2-3x/week  Timeframe: 1 month  Prognosis to achieve goals: fair    PT Signature: Tanna Zaragoza PT      Based upon review of the patient's progress and continued therapy plan, it is my medical opinion that Larry Walters should continue physical therapy treatment at [facility] PHYSICAL THERAPY  [address] [place] [id number] [phone].    Signature: __________________________________  Nikki Mcgarry MD    Manual Therapy:    0     mins  44306;  Therapeutic Exercise:    15     mins  79377;     Neuromuscular Ryne:    0    mins  14997;    Therapeutic Activity:     15     mins  42808;     Gait Training:      15     mins  64477;     Ultrasound:     0     mins  34123;    Work Hardening           0      mins 01347  Iontophoresis               0   mins 44473    Timed Treatment:   45   mins   Total Treatment:     55   mins

## 2021-10-07 ENCOUNTER — TREATMENT (OUTPATIENT)
Dept: PHYSICAL THERAPY | Facility: CLINIC | Age: 61
End: 2021-10-07

## 2021-10-07 DIAGNOSIS — G89.29 CHRONIC RIGHT SHOULDER PAIN: Primary | ICD-10-CM

## 2021-10-07 DIAGNOSIS — G89.29 CHRONIC LEFT SHOULDER PAIN: ICD-10-CM

## 2021-10-07 DIAGNOSIS — Z74.09 IMPAIRED FUNCTIONAL MOBILITY, BALANCE, GAIT, AND ENDURANCE: ICD-10-CM

## 2021-10-07 DIAGNOSIS — M25.512 CHRONIC LEFT SHOULDER PAIN: ICD-10-CM

## 2021-10-07 DIAGNOSIS — M25.511 CHRONIC RIGHT SHOULDER PAIN: Primary | ICD-10-CM

## 2021-10-07 PROCEDURE — 97110 THERAPEUTIC EXERCISES: CPT | Performed by: PHYSICAL THERAPIST

## 2021-10-07 PROCEDURE — 97530 THERAPEUTIC ACTIVITIES: CPT | Performed by: PHYSICAL THERAPIST

## 2021-10-07 NOTE — PROGRESS NOTES
Physical Therapy Daily Treatment Note    Patient: Larry Walters   : 1960  Diagnosis/ICD-10 Code:  Chronic right shoulder pain [M25.511, G89.29]  Referring practitioner: Nikki Mcgarry MD  Date of Initial Evaluation:  Type: THERAPY  Noted: 2021  Visit # 26      Subjective   No new changes.    Objective   See Exercise, Manual, and Modality Logs for complete treatment.       Assessment/Plan  Excellent tolerance to exercise w/o c/o pain. As pt fatigues, he tends to flex forward. He is able to correct w/ verbal cuing. Continues to have significantly limited shoulder motion B. Progress per POC.         Manual Therapy:    0     mins  64794;  Therapeutic Exercise:    30     mins  20537;     Neuromuscular Ryne:    0    mins  43312;    Therapeutic Activity:     15     mins  63605;     Gait Trainin     mins  93245;     Ultrasound:     0     mins  30305;    Work Hardening           0      mins 72624  Iontophoresis               0   mins 71848    Timed Treatment:   45   mins   Total Treatment:     45   mins    Tanna Zaragoza PT  Physical Therapist

## 2021-10-14 ENCOUNTER — TREATMENT (OUTPATIENT)
Dept: PHYSICAL THERAPY | Facility: CLINIC | Age: 61
End: 2021-10-14

## 2021-10-14 DIAGNOSIS — M25.511 CHRONIC RIGHT SHOULDER PAIN: Primary | ICD-10-CM

## 2021-10-14 DIAGNOSIS — M25.512 CHRONIC LEFT SHOULDER PAIN: ICD-10-CM

## 2021-10-14 DIAGNOSIS — Z74.09 IMPAIRED FUNCTIONAL MOBILITY, BALANCE, GAIT, AND ENDURANCE: ICD-10-CM

## 2021-10-14 DIAGNOSIS — G89.29 CHRONIC LEFT SHOULDER PAIN: ICD-10-CM

## 2021-10-14 DIAGNOSIS — G89.29 CHRONIC RIGHT SHOULDER PAIN: Primary | ICD-10-CM

## 2021-10-14 PROCEDURE — 97116 GAIT TRAINING THERAPY: CPT | Performed by: PHYSICAL THERAPIST

## 2021-10-14 PROCEDURE — 97530 THERAPEUTIC ACTIVITIES: CPT | Performed by: PHYSICAL THERAPIST

## 2021-10-14 PROCEDURE — 97110 THERAPEUTIC EXERCISES: CPT | Performed by: PHYSICAL THERAPIST

## 2021-10-14 NOTE — PROGRESS NOTES
Physical Therapy Daily Treatment Note    Patient: Larry Walters   : 1960  Diagnosis/ICD-10 Code:  Chronic right shoulder pain [M25.511, G89.29]  Referring practitioner: Nikki Mcgarry MD  Date of Initial Evaluation:  Type: THERAPY  Noted: 2021  Visit # 27      Subjective   No new changes.    Objective   See Exercise, Manual, and Modality Logs for complete treatment.       Assessment/Plan  Had difficulty increasing step length with 1 HHA as compared to 2 HHA. No c/o pain with exercise. Progress per POC.         Manual Therapy:    0     mins  81104;  Therapeutic Exercise:    15     mins  56629;     Neuromuscular Rnye:    0    mins  54250;    Therapeutic Activity:     15     mins  83600;     Gait Training:      10     mins  85791;     Ultrasound:     0     mins  09645;    Work Hardening           0      mins 64099  Iontophoresis               0   mins 54420    Timed Treatment:   40   mins   Total Treatment:     45   mins    Tanna Zaragoza, PT  Physical Therapist

## 2021-10-21 ENCOUNTER — TREATMENT (OUTPATIENT)
Dept: PHYSICAL THERAPY | Facility: CLINIC | Age: 61
End: 2021-10-21

## 2021-10-21 DIAGNOSIS — Z74.09 IMPAIRED FUNCTIONAL MOBILITY, BALANCE, GAIT, AND ENDURANCE: ICD-10-CM

## 2021-10-21 DIAGNOSIS — G89.29 CHRONIC LEFT SHOULDER PAIN: ICD-10-CM

## 2021-10-21 DIAGNOSIS — G89.29 CHRONIC RIGHT SHOULDER PAIN: Primary | ICD-10-CM

## 2021-10-21 DIAGNOSIS — M25.511 CHRONIC RIGHT SHOULDER PAIN: Primary | ICD-10-CM

## 2021-10-21 DIAGNOSIS — M25.512 CHRONIC LEFT SHOULDER PAIN: ICD-10-CM

## 2021-10-21 PROCEDURE — 97110 THERAPEUTIC EXERCISES: CPT | Performed by: PHYSICAL THERAPIST

## 2021-10-21 PROCEDURE — 97140 MANUAL THERAPY 1/> REGIONS: CPT | Performed by: PHYSICAL THERAPIST

## 2021-10-21 PROCEDURE — 97530 THERAPEUTIC ACTIVITIES: CPT | Performed by: PHYSICAL THERAPIST

## 2021-10-21 NOTE — PROGRESS NOTES
Physical Therapy Daily Treatment Note    Patient: Larry Walters   : 1960  Diagnosis/ICD-10 Code:  Chronic right shoulder pain [M25.511, G89.29]  Referring practitioner: Nikki Mcgarry MD  Date of Initial Evaluation:  Type: THERAPY  Noted: 2021  Visit # 28      Subjective   R shoulder sore x1 week. Pt thinks it may be related to PT exercises.    Objective   See Exercise, Manual, and Modality Logs for complete treatment.       Assessment/Plan  Pain w/ <45deg PROM R shoulder abd. Pain decreased with PA joint mobilization. Requires intermittent cuing for upright posture. Progress per POC.           Manual Therapy:    8     mins  36458;  Therapeutic Exercise:    15     mins  86038;     Neuromuscular Ryne:    0    mins  58419;    Therapeutic Activity:     15     mins  68396;     Gait Trainin     mins  14830;     Ultrasound:     0     mins  89955;    Work Hardening           0      mins 25038  Iontophoresis               0   mins 56340    Timed Treatment:   38   mins   Total Treatment:     45   mins    Tanna Zaragoza, PT  Physical Therapist

## 2021-10-28 ENCOUNTER — TREATMENT (OUTPATIENT)
Dept: PHYSICAL THERAPY | Facility: CLINIC | Age: 61
End: 2021-10-28

## 2021-10-28 DIAGNOSIS — M25.511 CHRONIC RIGHT SHOULDER PAIN: Primary | ICD-10-CM

## 2021-10-28 DIAGNOSIS — M25.512 CHRONIC LEFT SHOULDER PAIN: ICD-10-CM

## 2021-10-28 DIAGNOSIS — Z74.09 IMPAIRED FUNCTIONAL MOBILITY, BALANCE, GAIT, AND ENDURANCE: ICD-10-CM

## 2021-10-28 DIAGNOSIS — G89.29 CHRONIC LEFT SHOULDER PAIN: ICD-10-CM

## 2021-10-28 DIAGNOSIS — G89.29 CHRONIC RIGHT SHOULDER PAIN: Primary | ICD-10-CM

## 2021-10-28 PROCEDURE — 97116 GAIT TRAINING THERAPY: CPT | Performed by: PHYSICAL THERAPIST

## 2021-10-28 PROCEDURE — 97530 THERAPEUTIC ACTIVITIES: CPT | Performed by: PHYSICAL THERAPIST

## 2021-10-28 PROCEDURE — 97110 THERAPEUTIC EXERCISES: CPT | Performed by: PHYSICAL THERAPIST

## 2021-10-28 NOTE — PROGRESS NOTES
Physical Therapy Daily Treatment Note    Patient: Larry Walters   : 1960  Diagnosis/ICD-10 Code:  No primary diagnosis found.  Referring practitioner: Nikki Mcgarry MD  Date of Initial Evaluation:  Type: THERAPY  Noted: 2021  Visit # 29      Subjective   Feeling good today. Right shoulder felt better after last visit.    Objective   See Exercise, Manual, and Modality Logs for complete treatment.     Assessment/Plan  Improved gait when using single tip cane (L). Recommend using cane at home and in community. Progress per POC.           Manual Therapy:    0     mins  94176;  Therapeutic Exercise:    15     mins  64188;     Neuromuscular Ryne:    0    mins  32190;    Therapeutic Activity:     15     mins  14192;     Gait Training:      15     mins  85534;     Ultrasound:     0     mins  06560;    Work Hardening           0      mins 47732  Iontophoresis               0   mins 90702    Timed Treatment:   45   mins   Total Treatment:     60   mins    Tanna Zaragoza PT  Physical Therapist

## 2021-11-04 ENCOUNTER — TREATMENT (OUTPATIENT)
Dept: PHYSICAL THERAPY | Facility: CLINIC | Age: 61
End: 2021-11-04

## 2021-11-04 DIAGNOSIS — M25.511 CHRONIC RIGHT SHOULDER PAIN: Primary | ICD-10-CM

## 2021-11-04 DIAGNOSIS — M25.512 CHRONIC LEFT SHOULDER PAIN: ICD-10-CM

## 2021-11-04 DIAGNOSIS — G89.29 CHRONIC LEFT SHOULDER PAIN: ICD-10-CM

## 2021-11-04 DIAGNOSIS — G89.29 CHRONIC RIGHT SHOULDER PAIN: Primary | ICD-10-CM

## 2021-11-04 DIAGNOSIS — Z74.09 IMPAIRED FUNCTIONAL MOBILITY, BALANCE, GAIT, AND ENDURANCE: ICD-10-CM

## 2021-11-04 PROCEDURE — 97116 GAIT TRAINING THERAPY: CPT | Performed by: PHYSICAL THERAPIST

## 2021-11-04 PROCEDURE — 97110 THERAPEUTIC EXERCISES: CPT | Performed by: PHYSICAL THERAPIST

## 2021-11-04 PROCEDURE — 97530 THERAPEUTIC ACTIVITIES: CPT | Performed by: PHYSICAL THERAPIST

## 2021-11-04 NOTE — PROGRESS NOTES
Re-Evaluation        Patient: Larry Walters   : 1960  Diagnosis/ICD-10 Code:  Chronic right shoulder pain [M25.511, G89.29]  Referring practitioner: Nikki Mcgarry MD  Date of Initial Evaluation:  Type: THERAPY  Noted: 2021  Patient seen for 30 sessions      Subjective:   Larry Walters reports: I'd like to be able to walk more quickly. No c/o pain.    Subjective Questionnaire: QuickDASH: 47.73 vs 43.18 21  LEFS: 38/80 vs 42/80 21    Clinical Progress: unchanged  Home Program Compliance: No  Treatment has included: therapeutic exercise, manual therapy, therapeutic activity and gait training    Subjective   Objective   Active Range of Motion   Left Shoulder   Flexion: 128  Abduction: 125  External rotation BTH: Base of occiput  Internal rotation BTB: T10    Right Shoulder   Flexion: 110  Abduction: 88  External rotation BTH: Base of occiput  Internal rotation BTB: L1       Functional Assessment      Comments  5x STS: 36s (32s 9/3/21), 26s w/ UE  TUs (No change 9/3/21), 30s w/ L quad cane     Goals est 21, Re-evaluation 21  Short Term Goals: 4 weeks. Patient will:  1. Be independent with initial HEP MET  2. Demonstrate >10deg improvement in shoulder AROM in all planes  NOT MET  3. Demonstrate safe independent ambulation in clinic w/o AD MET    Long Term Goals: 8-12 weeks. Patient will:  1. Demonstrate improved Bilateral upper/lower extremity MMT of >/= 4+/5 to allow for performance of daily activities without pain. MET  2. Demonstrate upper extremity AROM WFL to allow for return to household & recreational activities w/o increased symptoms  NOT MET  3. Score w/in age matched norms for 5x STS and TUG functional testing demonstrating improved gait speed and endurance NOT MET  4. Perceived disability </= 10% as measured by LEFS and Quick DASH NOT MET  5. Be independent with long term HEP PROGRESSING    New Goals 21  Short Term Goals: 4 weeks. Patient will:  1. Be compliant w/ HEP 5x/week    2. Demonstrate step length WNL using cane w/o verbal cuing  3. Report pain </= 2/10 w/ ADLs    Long Term Goals: 8-12 weeks. Patient will:  1. Demonstrate upper extremity AROM WFL to allow for return to household & recreational activities w/o increased symptoms   2. Score w/in age matched norms for 5x STS and TUG functional testing using cane demonstrating improved gait speed and endurance   3. Perceived disability </= 25% as measured by LEFS and Quick DASH  4. Be independent with long term HEP     Assessment/Plan  Progress toward previous goals: Partially Met    No significant improvement in shoulder AROM or functional test scores. Pt requires verbal cuing for performance of HEP. Modified goals to allow for use of cane as AD seems most appropriate for safe ambulation.      Recommendations: Continue with recommendations 2x/week  Timeframe: 6 weeks  Prognosis to achieve goals: fair      Certification Period: 11/4/21-2/3/22  PT Signature: Tanna Zaragoza, PT   License [id number]      Based upon review of the patient's progress and continued therapy plan, it is my medical opinion that Larry Walters should continue physical therapy treatment at [facility]  [address] [place] [id number] [phone].    Signature: __________________________________  Nikki Mcgarry MD    Manual Therapy:    0     mins  68486;  Therapeutic Exercise:    15     mins  04582;     Neuromuscular Ryne:    0    mins  19746;    Therapeutic Activity:     15     mins  18419;     Gait Training:      15     mins  08477;     Ultrasound:     0     mins  69777;    Work Hardening           0      mins 12992  Iontophoresis               0   mins 04997    Timed Treatment:   45   mins   Total Treatment:     45   mins

## 2021-11-11 ENCOUNTER — TREATMENT (OUTPATIENT)
Dept: PHYSICAL THERAPY | Facility: CLINIC | Age: 61
End: 2021-11-11

## 2021-11-11 DIAGNOSIS — G89.29 CHRONIC RIGHT SHOULDER PAIN: Primary | ICD-10-CM

## 2021-11-11 DIAGNOSIS — Z74.09 IMPAIRED FUNCTIONAL MOBILITY, BALANCE, GAIT, AND ENDURANCE: ICD-10-CM

## 2021-11-11 DIAGNOSIS — M25.511 CHRONIC RIGHT SHOULDER PAIN: Primary | ICD-10-CM

## 2021-11-11 DIAGNOSIS — M25.512 CHRONIC LEFT SHOULDER PAIN: ICD-10-CM

## 2021-11-11 DIAGNOSIS — G89.29 CHRONIC LEFT SHOULDER PAIN: ICD-10-CM

## 2021-11-11 PROCEDURE — 97110 THERAPEUTIC EXERCISES: CPT | Performed by: PHYSICAL THERAPIST

## 2021-11-11 PROCEDURE — 97530 THERAPEUTIC ACTIVITIES: CPT | Performed by: PHYSICAL THERAPIST

## 2021-11-11 NOTE — PROGRESS NOTES
Physical Therapy Daily Treatment Note    Patient: Larry Walters   : 1960  Diagnosis/ICD-10 Code:  Chronic right shoulder pain [M25.511, G89.29]  Referring practitioner: Nikki Mcgarry MD  Date of Initial Evaluation:  Type: THERAPY  Noted: 2021  Visit # 31      Subjective   No complaints.    Objective   See Exercise, Manual, and Modality Logs for complete treatment.       Assessment/Plan  Excellent tolerance to exercise routine w/o c/o pain. Demonstrates step through gait pattern using quad cane w/o verbal cuing. Progress per POC.           Manual Therapy:    0     mins  98772;  Therapeutic Exercise:    30     mins  33282;     Neuromuscular Ryne:    0    mins  12370;    Therapeutic Activity:     10     mins  74921;     Gait Trainin     mins  04961;     Ultrasound:     0     mins  79295;    Work Hardening           0      mins 82878  Iontophoresis               0   mins 59666    Timed Treatment:   40   mins   Total Treatment:     50   mins    Tanna Zaragoza, PT  Physical Therapist

## 2021-12-02 ENCOUNTER — TREATMENT (OUTPATIENT)
Dept: PHYSICAL THERAPY | Facility: CLINIC | Age: 61
End: 2021-12-02

## 2021-12-02 DIAGNOSIS — G89.29 CHRONIC RIGHT SHOULDER PAIN: Primary | ICD-10-CM

## 2021-12-02 DIAGNOSIS — M25.511 CHRONIC RIGHT SHOULDER PAIN: Primary | ICD-10-CM

## 2021-12-02 DIAGNOSIS — G89.29 CHRONIC LEFT SHOULDER PAIN: ICD-10-CM

## 2021-12-02 DIAGNOSIS — Z74.09 IMPAIRED FUNCTIONAL MOBILITY, BALANCE, GAIT, AND ENDURANCE: ICD-10-CM

## 2021-12-02 DIAGNOSIS — M25.512 CHRONIC LEFT SHOULDER PAIN: ICD-10-CM

## 2021-12-02 PROCEDURE — 97110 THERAPEUTIC EXERCISES: CPT | Performed by: PHYSICAL THERAPIST

## 2021-12-02 NOTE — PROGRESS NOTES
Physical Therapy Daily Treatment Note      Patient: Larry Walters   : 1960  Referring practitioner: Nikki Mcgarry MD  Date of Initial Visit: Type: THERAPY  Noted: 2021  Today's Date: 2021  Patient seen for 32 sessions       Visit Diagnoses:    ICD-10-CM ICD-9-CM   1. Chronic right shoulder pain  M25.511 719.41    G89.29 338.29   2. Chronic left shoulder pain  M25.512 719.41    G89.29 338.29   3. Impaired functional mobility, balance, gait, and endurance  Z74.09 V49.89       Subjective   No new changes.    Objective   See Exercise, Manual, and Modality Logs for complete treatment.       Assessment/Plan  Reported shoulder pain x1 w/ AAROM flexion at wall. Did well w/ all other exercise. Progress per POC.    Timed:         Manual Therapy:    0     mins  36147;     Therapeutic Exercise:    40     mins  57405;     Neuromuscular Ryne:    0    mins  07960;    Therapeutic Activity:     0     mins  05467;     Gait Trainin     mins  54975;     Ultrasound:     0     mins  14780;    Ionto                               0    mins   66883        Timed Treatment:   40   mins   Total Treatment:     50   mins    Tanna Dick, PT  [place] License: [id number]

## 2021-12-09 ENCOUNTER — TREATMENT (OUTPATIENT)
Dept: PHYSICAL THERAPY | Facility: CLINIC | Age: 61
End: 2021-12-09

## 2021-12-09 DIAGNOSIS — M25.512 CHRONIC LEFT SHOULDER PAIN: ICD-10-CM

## 2021-12-09 DIAGNOSIS — M25.511 CHRONIC RIGHT SHOULDER PAIN: Primary | ICD-10-CM

## 2021-12-09 DIAGNOSIS — Z74.09 IMPAIRED FUNCTIONAL MOBILITY, BALANCE, GAIT, AND ENDURANCE: ICD-10-CM

## 2021-12-09 DIAGNOSIS — G89.29 CHRONIC RIGHT SHOULDER PAIN: Primary | ICD-10-CM

## 2021-12-09 DIAGNOSIS — G89.29 CHRONIC LEFT SHOULDER PAIN: ICD-10-CM

## 2021-12-09 PROCEDURE — 97530 THERAPEUTIC ACTIVITIES: CPT | Performed by: PHYSICAL THERAPIST

## 2021-12-09 PROCEDURE — 97110 THERAPEUTIC EXERCISES: CPT | Performed by: PHYSICAL THERAPIST

## 2021-12-09 NOTE — PROGRESS NOTES
Re-Assessment / Re-Certification        Patient: Larry Walters   : 1960  Diagnosis/ICD-10 Code:  Chronic right shoulder pain [M25.511, G89.29]  Referring practitioner: Nikki Mcgarry MD  Date of Initial Evaluation:  Type: THERAPY  Noted: 2021  Patient seen for 33 sessions      Subjective:   Larry Walters reports: Pain 0/10. My knee hurts in the morning but other than that I'm doing well.  Subjective Questionnaire: QuickDASH: 59 vs 47 21  LEFS: 38 (unchanged)  Clinical Progress: Subjective reports of shoulder function improved, lower extremity unchanged  Home Program Compliance: No  Treatment has included: therapeutic exercise, therapeutic activity and gait training    Subjective   Objective   Active Range of Motion   Left Shoulder   Flexion: 130  Abduction: 117  External rotation BTH: Base of occiput  Internal rotation BTB: T12    Right Shoulder   Flexion: 105  Abduction: 88  External rotation BTH: Base of occiput  Internal rotation BTB: L1     Strength  L shoulder ER 3-/5, abd 4/5      Functional Assessment      Comments  5x STS: 27s (36s 21), 26s w/ UE  TUs (24s 21), 25s w/ L quad cane (30s 21)    New Goals 21  Short Term Goals: 4 weeks. Patient will:  1. Be compliant w/ HEP 5x/week NOT MET  2. Demonstrate step length WNL using cane w/o verbal cuing MET  3. Report pain </= 2/10 w/ ADLs MET    Long Term Goals: 8-12 weeks. Patient will:  1. Demonstrate upper extremity AROM WFL to allow for return to household & recreational activities w/o increased symptoms NOT MET  2. Score w/in age matched norms for 5x STS and TUG functional testing using cane demonstrating improved gait speed and endurance NOT MET  3. Perceived disability </= 25% as measured by LEFS and Quick DASH NOT MET  4. Be independent with long term HEP NOT MET    Assessment/Plan  Progress toward previous goals: Partially Met    Improved subjective reports of pain. Significantly improved score on 5x STS demonstrating improved  LE strength and transfer. Scores on 5x STS and TUG still indicate pt is at risk for fall. Continues to have limited shoulder mobility impairing his ability to reach above shoulder height.      Recommendations: Continue as planned  Timeframe: 2 months  Prognosis to achieve goals: fair    PT Signature: Tanna Dick PT      Based upon review of the patient's progress and continued therapy plan, it is my medical opinion that Larry Walters should continue physical therapy treatment at [facility]  [address] [place] [id number] [phone].    Signature: __________________________________  Nikki Mcgarry MD    Manual Therapy:    0     mins  59505;  Therapeutic Exercise:    30     mins  47083;     Neuromuscular Ryne:    0    mins  67579;    Therapeutic Activity:     15     mins  22983;     Gait Trainin     mins  18509;     Ultrasound:     0     mins  27350;    Work Hardening           0      mins 70916  Iontophoresis               0   mins 90119    Timed Treatment:   45   mins   Total Treatment:     45   mins

## 2022-01-20 ENCOUNTER — TREATMENT (OUTPATIENT)
Dept: PHYSICAL THERAPY | Facility: CLINIC | Age: 62
End: 2022-01-20

## 2022-01-20 DIAGNOSIS — R26.2 DIFFICULTY WALKING: ICD-10-CM

## 2022-01-20 DIAGNOSIS — M25.511 CHRONIC RIGHT SHOULDER PAIN: ICD-10-CM

## 2022-01-20 DIAGNOSIS — M54.50 MIDLINE LOW BACK PAIN WITHOUT SCIATICA, UNSPECIFIED CHRONICITY: ICD-10-CM

## 2022-01-20 DIAGNOSIS — G89.29 CHRONIC LEFT SHOULDER PAIN: ICD-10-CM

## 2022-01-20 DIAGNOSIS — M25.512 CHRONIC LEFT SHOULDER PAIN: ICD-10-CM

## 2022-01-20 DIAGNOSIS — Z74.09 IMPAIRED FUNCTIONAL MOBILITY, BALANCE, GAIT, AND ENDURANCE: Primary | ICD-10-CM

## 2022-01-20 DIAGNOSIS — G89.29 CHRONIC RIGHT SHOULDER PAIN: ICD-10-CM

## 2022-01-20 PROCEDURE — 97530 THERAPEUTIC ACTIVITIES: CPT | Performed by: PHYSICAL THERAPIST

## 2022-01-20 PROCEDURE — 97110 THERAPEUTIC EXERCISES: CPT | Performed by: PHYSICAL THERAPIST

## 2022-01-20 NOTE — PROGRESS NOTES
Re-Evaluation        Patient: Larry Walters   : 1960  Diagnosis/ICD-10 Code:  Impaired functional mobility, balance, gait, and endurance [Z74.09]  Referring practitioner: No ref. provider found  Date of Initial Evaluation:  Type: THERAPY  Noted: 2021  Patient seen for 34 sessions      Subjective:   Larry Walters reports: When I get out of bed in the morning, R low back is stiff. Mattress is 15 years old. Pain in both shoulders. Denies knee pain. I am trying to walk 11 laps in hallway (no cane) with occupational therapist 1x/week. I take dog for walk 1x/week. Rates pain 5/10  Subjective Questionnaire: QuickDASH: 38.64  LEFS: 39  Clinical Progress: unchanged  Home Program Compliance: No  Treatment has included: therapeutic exercise, therapeutic activity and gait training       Subjective   Objective   Active Range of Motion   Left Shoulder   Flexion: 134  Abduction: 122  External rotation BTH: Base of occiput  Internal rotation BTB: T12    Right Shoulder   Flexion: 105  Abduction: 88  External rotation BTH: Base of occiput  Internal rotation BTB: L1     Lumbar  Flexion: WNL  Extension: 0  SB: WNL  Rotation:    Strength  L shoulder ER 3-/5  Hip flexion: 5/5 painfree     Functional Assessment      Comments  5x STS: 29s no UEs, 20s w/ UEs  TUs unassisted, 22s w/ L quad cane       New Goals 22  Short Term Goals: 4 weeks. Patient will:  1. Be compliant w/ HEP 4x/week   2. Demonstrate step length WNL using cane w/o verbal cuing   3. Report pain </= 2/10 w/ ADLs    Long Term Goals: 8-12 weeks. Patient will:  1. Demonstrate upper extremity AROM WFL to allow for return to household & recreational activities w/o increased symptoms   2. Score w/in age matched norms for 5x STS and TUG functional testing using cane demonstrating improved gait speed and endurance   3. Perceived disability </= 25% as measured by LEFS and Quick DASH  4. Be independent with long term HEP       Assessment/Plan  Progress toward previous  goals: Not Met    Continues to have significantly limited shoulder AROM B, L shoulder ER weakness, impaired gait, and scores below age matched norms on 5x STS and TUG. Tolerates exercise well. Will continue to benefit from skilled PT to improve functional capacity, increase independence, decrease pain, and decrease fall risk.      Recommendations: Continue with recommendations 2x/week  Timeframe: 3 months  Prognosis to achieve goals: fair    PT Signature: Tanna Dick, PT      Based upon review of the patient's progress and continued therapy plan, it is my medical opinion that Larry Walters should continue physical therapy treatment at [facility]  [address] [place] [id number] [phone].    Signature: __________________________________      Manual Therapy:    0     mins  75893;  Therapeutic Exercise:    15     mins  74067;     Neuromuscular Ryne:    0    mins  48084;    Therapeutic Activity:     30     mins  67355;     Gait Trainin     mins  75435;     Ultrasound:     0     mins  98190;    Work Hardening           0      mins 72463  Iontophoresis               0   mins 80350    Timed Treatment:   45   mins   Total Treatment:     45   mins

## 2022-01-20 NOTE — PATIENT INSTRUCTIONS
Access Code: [email or web address]  URL: [email or web address]  Date: 01/20/2022  Prepared by: Tanna Dick    Exercises  Sit to Stand with Armchair - 1 x daily - 7 x weekly - 2 sets - 10 reps  Standing shoulder flexion wall slides - 1 x daily - 7 x weekly - 2 sets - 10 reps  Standing Bent Over Single Arm Scapular Row with Table Support - 1 x daily - 7 x weekly - 2 sets - 10 reps  Bent over shoulder extension - 1 x daily - 7 x weekly - 2 sets - 10 reps  Side Stepping with Counter Support - 1 x daily - 7 x weekly - 2 sets - 10 reps  Standing Shoulder Scaption - 1 x daily - 7 x weekly - 2 sets - 10 reps  Heel Toe Raises with Counter Support - 1 x daily - 7 x weekly - 2 sets - 10 reps

## 2022-01-27 ENCOUNTER — TRANSCRIBE ORDERS (OUTPATIENT)
Dept: PHYSICAL THERAPY | Facility: CLINIC | Age: 62
End: 2022-01-27

## 2022-01-31 ENCOUNTER — TRANSCRIBE ORDERS (OUTPATIENT)
Dept: PHYSICAL THERAPY | Facility: CLINIC | Age: 62
End: 2022-01-31

## 2022-01-31 DIAGNOSIS — M19.90 ARTHRITIS: Primary | ICD-10-CM

## 2022-02-10 ENCOUNTER — TELEPHONE (OUTPATIENT)
Dept: PHYSICAL THERAPY | Facility: CLINIC | Age: 62
End: 2022-02-10

## 2022-02-17 ENCOUNTER — TREATMENT (OUTPATIENT)
Dept: PHYSICAL THERAPY | Facility: CLINIC | Age: 62
End: 2022-02-17

## 2022-02-17 DIAGNOSIS — M25.512 CHRONIC LEFT SHOULDER PAIN: ICD-10-CM

## 2022-02-17 DIAGNOSIS — Z74.09 IMPAIRED FUNCTIONAL MOBILITY, BALANCE, GAIT, AND ENDURANCE: Primary | ICD-10-CM

## 2022-02-17 DIAGNOSIS — M25.511 CHRONIC RIGHT SHOULDER PAIN: ICD-10-CM

## 2022-02-17 DIAGNOSIS — G89.29 CHRONIC RIGHT SHOULDER PAIN: ICD-10-CM

## 2022-02-17 DIAGNOSIS — M54.50 MIDLINE LOW BACK PAIN WITHOUT SCIATICA, UNSPECIFIED CHRONICITY: ICD-10-CM

## 2022-02-17 DIAGNOSIS — G89.29 CHRONIC LEFT SHOULDER PAIN: ICD-10-CM

## 2022-02-17 DIAGNOSIS — R26.2 DIFFICULTY WALKING: ICD-10-CM

## 2022-02-17 PROCEDURE — 97110 THERAPEUTIC EXERCISES: CPT | Performed by: PHYSICAL THERAPIST

## 2022-02-17 PROCEDURE — 97140 MANUAL THERAPY 1/> REGIONS: CPT | Performed by: PHYSICAL THERAPIST

## 2022-02-17 NOTE — PROGRESS NOTES
Physical Therapy Daily Treatment Note      Patient: Larry Walters   : 1960  Referring practitioner: Nikki Mcgarry MD  Date of Initial Visit: Type: THERAPY  Noted: 2021  Today's Date: 2022  Patient seen for 35 sessions       Visit Diagnoses:    ICD-10-CM ICD-9-CM   1. Impaired functional mobility, balance, gait, and endurance  Z74.09 V49.89   2. Chronic right shoulder pain  M25.511 719.41    G89.29 338.29   3. Chronic left shoulder pain  M25.512 719.41    G89.29 338.29   4. Midline low back pain without sciatica, unspecified chronicity  M54.50 724.2   5. Difficulty walking  R26.2 719.7       Subjective   My left shoulder and shoulder blade started hurting a few weeks ago. Mostly at night. Pain raising arm and extending. Rates pain 9/10.    Objective          Palpation   Left   Tenderness of the infraspinatus, middle trapezius, rhomboids, supraspinatus and upper trapezius.       See Exercise, Manual, and Modality Logs for complete treatment.     Assessment/Plan  Continues to have limited L shoulder PROM and diffuse tenderness. Deferred overhead exercise. No c/o pain with therapeutic exercise routine. Pt reported L shoulder pain decreased from 9/10-0/10 after manual interventions and exercise. Recert next visit    Timed:         Manual Therapy:    10     mins  38622;     Therapeutic Exercise:    30     mins  07515;     Neuromuscular Ryne:    0    mins  02298;    Therapeutic Activity:     0     mins  86551;     Gait Trainin     mins  64424;     Ultrasound:     0     mins  10754;    Ionto                               0    mins   73696        Timed Treatment:   40   mins   Total Treatment:     40   mins    Tanna Dick PT  [place] License: [id number]

## 2022-03-03 ENCOUNTER — TREATMENT (OUTPATIENT)
Dept: PHYSICAL THERAPY | Facility: CLINIC | Age: 62
End: 2022-03-03

## 2022-03-03 DIAGNOSIS — G89.29 CHRONIC RIGHT SHOULDER PAIN: ICD-10-CM

## 2022-03-03 DIAGNOSIS — G89.29 CHRONIC LEFT SHOULDER PAIN: ICD-10-CM

## 2022-03-03 DIAGNOSIS — M25.512 CHRONIC LEFT SHOULDER PAIN: ICD-10-CM

## 2022-03-03 DIAGNOSIS — R26.2 DIFFICULTY WALKING: ICD-10-CM

## 2022-03-03 DIAGNOSIS — M25.511 CHRONIC RIGHT SHOULDER PAIN: ICD-10-CM

## 2022-03-03 DIAGNOSIS — Z74.09 IMPAIRED FUNCTIONAL MOBILITY, BALANCE, GAIT, AND ENDURANCE: Primary | ICD-10-CM

## 2022-03-03 DIAGNOSIS — M54.50 MIDLINE LOW BACK PAIN WITHOUT SCIATICA, UNSPECIFIED CHRONICITY: ICD-10-CM

## 2022-03-03 PROCEDURE — 97110 THERAPEUTIC EXERCISES: CPT | Performed by: PHYSICAL THERAPIST

## 2022-03-03 PROCEDURE — 97530 THERAPEUTIC ACTIVITIES: CPT | Performed by: PHYSICAL THERAPIST

## 2022-03-03 PROCEDURE — 97140 MANUAL THERAPY 1/> REGIONS: CPT | Performed by: PHYSICAL THERAPIST

## 2022-03-03 NOTE — PROGRESS NOTES
Re-Assessment / Re-Certification        Patient: Larry Walters   : 1960  Diagnosis/ICD-10 Code:  Impaired functional mobility, balance, gait, and endurance [Z74.09]  Referring practitioner: Nikki Mcgarry MD  Date of Initial Evaluation:  Type: THERAPY  Noted: 2021  Patient seen for 36 sessions      Subjective:   Larry Walters reports: Left shoulder and shoulder blade are stiff. Pain getting out of chair or out of bed. Rates this pain 9/10. I can't lift a bag from the floor because of my shoulders. I can get things out of fridge to make a meal. We did an exercise that cracked my back, and I would like that to happen again.    Subjective Questionnaire: QuickDASH: 63.64  LEFS: 36  Clinical Progress: unchanged  Home Program Compliance: No  Treatment has included: therapeutic exercise, manual therapy, therapeutic activity and gait training    Subjective   Objective   Active Range of Motion   Left Shoulder   Flexion: 124  Abduction: 115 Pain  External rotation BTH: Base of occiput  Internal rotation BTB: T12    Right Shoulder   Flexion: 107 Pain  Abduction: 80 Pain  External rotation BTH: Base of occiput  Internal rotation BTB: L1     Lumbar  Flexion: WNL  Extension: 0  SB: WNL  Rotation: 50% WNL     Strength  L shoulder ER 3-/5    Palpation  L shoulder TTP medial scapular border, infraspinatus, supraspinatus, anterior shoulder (diffuse)     Functional Assessment      Comments  5x STS: 27s no UEs, 24s w/ UEs  TUs unassisted, 22s w/ L quad cane       New Goals 22  Short Term Goals: 4 weeks. Patient will:  1. Be compliant w/ HEP 4x/week NOT MET  2. Demonstrate step length WNL using cane w/o verbal cuing MET  3. Report pain </= 2/10 w/ ADLs NOT MET    Long Term Goals: 8-12 weeks. Patient will:  1. Demonstrate upper extremity AROM WFL to allow for return to household & recreational activities w/o increased symptoms   2. Score w/in age matched norms for 5x STS and TUG functional testing using cane demonstrating  improved gait speed and endurance   3. Perceived disability </= 25% as measured by LEFS and Quick DASH  4. Be independent with long term HEP   ALL NOT MET     Assessment/Plan  Progress toward previous goals: Partially Met    Increased L shoulder pain as of late. Some relief with manual interventions and exercise. Recommend follow up w/ provider if symptoms are not well managed with PT.    Time on 5x STS and TUG unchanged. Gait largely limited by left knee OA.    While function remains limited, pt reports increasing independence with ADLs and is pain free at rest.      Recommendations: Continue with recommendations 2-3x/week  Timeframe: 6 weeks  Prognosis to achieve goals: fair    PT Signature: Tanna Dick PT      Based upon review of the patient's progress and continued therapy plan, it is my medical opinion that Larry Walters should continue physical therapy treatment at [facility] PH[facility] TH[facility]  [address] [place] [id number] [phone].    Signature: __________________________________  Nikki Mcgarry MD    Manual Therapy:    8     mins  50971;  Therapeutic Exercise:    20     mins  17981;     Neuromuscular Ryne:    0    mins  78911;    Therapeutic Activity:     10     mins  43390;     Gait Trainin     mins  43029;     Ultrasound:     0     mins  46215;    Work Hardening           0      mins 04189  Iontophoresis               0   mins 01515    Timed Treatment:   38   mins   Total Treatment:     45   mins

## 2022-03-17 ENCOUNTER — TREATMENT (OUTPATIENT)
Dept: PHYSICAL THERAPY | Facility: CLINIC | Age: 62
End: 2022-03-17

## 2022-03-17 DIAGNOSIS — G89.29 CHRONIC RIGHT SHOULDER PAIN: ICD-10-CM

## 2022-03-17 DIAGNOSIS — M25.512 CHRONIC LEFT SHOULDER PAIN: ICD-10-CM

## 2022-03-17 DIAGNOSIS — M54.50 MIDLINE LOW BACK PAIN WITHOUT SCIATICA, UNSPECIFIED CHRONICITY: ICD-10-CM

## 2022-03-17 DIAGNOSIS — Z74.09 IMPAIRED FUNCTIONAL MOBILITY, BALANCE, GAIT, AND ENDURANCE: Primary | ICD-10-CM

## 2022-03-17 DIAGNOSIS — M25.511 CHRONIC RIGHT SHOULDER PAIN: ICD-10-CM

## 2022-03-17 DIAGNOSIS — R26.2 DIFFICULTY WALKING: ICD-10-CM

## 2022-03-17 DIAGNOSIS — G89.29 CHRONIC LEFT SHOULDER PAIN: ICD-10-CM

## 2022-03-17 PROCEDURE — 97530 THERAPEUTIC ACTIVITIES: CPT | Performed by: PHYSICAL THERAPIST

## 2022-03-17 PROCEDURE — 97110 THERAPEUTIC EXERCISES: CPT | Performed by: PHYSICAL THERAPIST

## 2022-03-17 PROCEDURE — 97140 MANUAL THERAPY 1/> REGIONS: CPT | Performed by: PHYSICAL THERAPIST

## 2022-03-17 NOTE — PATIENT INSTRUCTIONS
Access Code: [email or web address]  URL: [email or web address]  Date: 03/17/2022  Prepared by: Tanna Dick    Exercises  Supine Shoulder Flexion AAROM with Hands Clasped - 1 x daily - 7 x weekly - 1 sets - 10 reps - 5s hold  Shoulder Rolls in Sitting - 1 x daily - 7 x weekly - 1 sets - 10 reps  Seated Scapular Retraction - 1 x daily - 7 x weekly - 1 sets - 10 reps - 5s hold  Sit to Stand with Hands on Knees - 1 x daily - 7 x weekly - 1 sets - 10 reps  Seated March - 1 x daily - 7 x weekly - 1 sets - 10 reps - 2s hold  Seated Long Arc Quad - 1 x daily - 7 x weekly - 1 sets - 15 reps - 2s hold  Heel Toe Raises with Counter Support - 1 x daily - 7 x weekly - 1 sets - 20 reps  Side Stepping with Counter Support - 1 x daily - 7 x weekly - 3 sets - 10 reps  Standing Shoulder Flexion with Dowel to 90 - 1 x daily - 7 x weekly - 1 sets - 20 reps

## 2022-03-17 NOTE — PROGRESS NOTES
Physical Therapy Daily Treatment Note      Patient: Larry Walters   : 1960  Referring practitioner: Nikki Mcgarry MD  Date of Initial Visit: Type: THERAPY  Noted: 2021  Today's Date: 3/17/2022  Patient seen for 37 sessions       Visit Diagnoses:    ICD-10-CM ICD-9-CM   1. Impaired functional mobility, balance, gait, and endurance  Z74.09 V49.89   2. Chronic right shoulder pain  M25.511 719.41    G89.29 338.29   3. Chronic left shoulder pain  M25.512 719.41    G89.29 338.29   4. Midline low back pain without sciatica, unspecified chronicity  M54.50 724.2   5. Difficulty walking  R26.2 719.7       Subjective   L shoulder/shoulder blade hurts. Relief for 1-2 days after last visit, but relief did not last. States he is doing several exercises at home.    Objective   See Exercise, Manual, and Modality Logs for complete treatment.     Assessment/Plan  Pt reports relief with manual interventions. No c/o pain with exercise. Issued updated HEP for shoulder mobility and scapular/core/LE strengthening.    Timed:         Manual Therapy:    10     mins  41829;     Therapeutic Exercise:    15     mins  24309;     Neuromuscular Ryne:    0    mins  71441;    Therapeutic Activity:     15     mins  75119;     Gait Trainin     mins  10717;     Ultrasound:     0     mins  27975;    Ionto                               0    mins   45843        Timed Treatment:   40   mins   Total Treatment:     45   mins    Tanna Dick, PT  [place] License: [id number]

## 2022-03-24 ENCOUNTER — TREATMENT (OUTPATIENT)
Dept: PHYSICAL THERAPY | Facility: CLINIC | Age: 62
End: 2022-03-24

## 2022-03-24 DIAGNOSIS — Z74.09 IMPAIRED FUNCTIONAL MOBILITY, BALANCE, GAIT, AND ENDURANCE: Primary | ICD-10-CM

## 2022-03-24 DIAGNOSIS — M25.511 CHRONIC RIGHT SHOULDER PAIN: ICD-10-CM

## 2022-03-24 DIAGNOSIS — G89.29 CHRONIC LEFT SHOULDER PAIN: ICD-10-CM

## 2022-03-24 DIAGNOSIS — M54.50 MIDLINE LOW BACK PAIN WITHOUT SCIATICA, UNSPECIFIED CHRONICITY: ICD-10-CM

## 2022-03-24 DIAGNOSIS — G89.29 CHRONIC RIGHT SHOULDER PAIN: ICD-10-CM

## 2022-03-24 DIAGNOSIS — R26.2 DIFFICULTY WALKING: ICD-10-CM

## 2022-03-24 DIAGNOSIS — M25.512 CHRONIC LEFT SHOULDER PAIN: ICD-10-CM

## 2022-03-24 PROCEDURE — 97110 THERAPEUTIC EXERCISES: CPT | Performed by: PHYSICAL THERAPIST

## 2022-03-24 PROCEDURE — 97530 THERAPEUTIC ACTIVITIES: CPT | Performed by: PHYSICAL THERAPIST

## 2022-03-24 NOTE — PROGRESS NOTES
Physical Therapy Daily Treatment Note      Patient: Larry Walters   : 1960  Referring practitioner: Nikki Mcgarry MD  Date of Initial Visit: Type: THERAPY  Noted: 2021  Today's Date: 3/24/2022  Patient seen for 38 sessions       Visit Diagnoses:    ICD-10-CM ICD-9-CM   1. Impaired functional mobility, balance, gait, and endurance  Z74.09 V49.89   2. Chronic right shoulder pain  M25.511 719.41    G89.29 338.29   3. Chronic left shoulder pain  M25.512 719.41    G89.29 338.29   4. Midline low back pain without sciatica, unspecified chronicity  M54.50 724.2   5. Difficulty walking  R26.2 719.7       Subjective   Shoulders sore since adding cane lift at last visit. That was the only new exercise, so that must have been the cause. Rates pain 9-10/10.    Objective   See Exercise, Manual, and Modality Logs for complete treatment.     Assessment/Plan  Unfortunately cane lift was not a new exercise, so I can't discern cause of increased pain. Pain decreased to 4/10 w/ exercises today. Declined ice or heat. Recommend follow up w/ MD regarding shoulder pain.    Timed:         Manual Therapy:    0     mins  27680;     Therapeutic Exercise:    30     mins  80478;     Neuromuscular Ryne:    0    mins  32848;    Therapeutic Activity:     10     mins  60051;     Gait Trainin     mins  50623;     Ultrasound:     0     mins  63687;    Ionto                               0    mins   25277        Timed Treatment:   40   mins   Total Treatment:     45   mins    Tanna Dick, PT  [place] License: [id number]

## 2022-03-31 ENCOUNTER — TREATMENT (OUTPATIENT)
Dept: PHYSICAL THERAPY | Facility: CLINIC | Age: 62
End: 2022-03-31

## 2022-03-31 DIAGNOSIS — G89.29 CHRONIC RIGHT SHOULDER PAIN: ICD-10-CM

## 2022-03-31 DIAGNOSIS — M25.511 CHRONIC RIGHT SHOULDER PAIN: ICD-10-CM

## 2022-03-31 DIAGNOSIS — R26.2 DIFFICULTY WALKING: ICD-10-CM

## 2022-03-31 DIAGNOSIS — G89.29 CHRONIC LEFT SHOULDER PAIN: ICD-10-CM

## 2022-03-31 DIAGNOSIS — Z74.09 IMPAIRED FUNCTIONAL MOBILITY, BALANCE, GAIT, AND ENDURANCE: Primary | ICD-10-CM

## 2022-03-31 DIAGNOSIS — M54.50 MIDLINE LOW BACK PAIN WITHOUT SCIATICA, UNSPECIFIED CHRONICITY: ICD-10-CM

## 2022-03-31 DIAGNOSIS — M25.512 CHRONIC LEFT SHOULDER PAIN: ICD-10-CM

## 2022-03-31 PROCEDURE — 97110 THERAPEUTIC EXERCISES: CPT | Performed by: PHYSICAL THERAPIST

## 2022-03-31 PROCEDURE — 97530 THERAPEUTIC ACTIVITIES: CPT | Performed by: PHYSICAL THERAPIST

## 2022-03-31 NOTE — PROGRESS NOTES
Physical Therapy Daily Treatment Note      Patient: Larry Walters   : 1960  Referring practitioner: Nikki Mcgarry MD  Date of Initial Visit: Type: THERAPY  Noted: 2021  Today's Date: 3/31/2022  Patient seen for 39 sessions       Visit Diagnoses:    ICD-10-CM ICD-9-CM   1. Impaired functional mobility, balance, gait, and endurance  Z74.09 V49.89   2. Chronic right shoulder pain  M25.511 719.41    G89.29 338.29   3. Chronic left shoulder pain  M25.512 719.41    G89.29 338.29   4. Midline low back pain without sciatica, unspecified chronicity  M54.50 724.2   5. Difficulty walking  R26.2 719.7       Subjective   Doing good. Shoulders feel better day of PT, then progressively worsen. Rates pain 10/10 (L) 9/10 (R).    Objective   See Exercise, Manual, and Modality Logs for complete treatment.     Assessment/Plan  Shoulder pain unchanged with exercise, unlike previous visits. Pt required verbal cuing for upright posture with ambulatory exercises. Has tendency towards forward flexion. Requires 1 HHA for toe taps due to LE weakness/poor single leg balance.   Progress per POC.    Timed:         Manual Therapy:    0     mins  66415;     Therapeutic Exercise:    25     mins  62130;     Neuromuscular Ryne:    0    mins  75142;    Therapeutic Activity:     15     mins  78476;     Gait Trainin     mins  68312;     Ultrasound:     0     mins  69703;    Ionto                               0    mins   46345        Timed Treatment:   40   mins   Total Treatment:     50   mins    Tanna Dick PT  [place] License: [id number]

## 2022-04-14 ENCOUNTER — TREATMENT (OUTPATIENT)
Dept: PHYSICAL THERAPY | Facility: CLINIC | Age: 62
End: 2022-04-14

## 2022-04-14 DIAGNOSIS — M25.511 CHRONIC RIGHT SHOULDER PAIN: ICD-10-CM

## 2022-04-14 DIAGNOSIS — R26.2 DIFFICULTY WALKING: ICD-10-CM

## 2022-04-14 DIAGNOSIS — M25.512 CHRONIC LEFT SHOULDER PAIN: ICD-10-CM

## 2022-04-14 DIAGNOSIS — Z74.09 IMPAIRED FUNCTIONAL MOBILITY, BALANCE, GAIT, AND ENDURANCE: Primary | ICD-10-CM

## 2022-04-14 DIAGNOSIS — M54.50 MIDLINE LOW BACK PAIN WITHOUT SCIATICA, UNSPECIFIED CHRONICITY: ICD-10-CM

## 2022-04-14 DIAGNOSIS — G89.29 CHRONIC RIGHT SHOULDER PAIN: ICD-10-CM

## 2022-04-14 DIAGNOSIS — G89.29 CHRONIC LEFT SHOULDER PAIN: ICD-10-CM

## 2022-04-14 PROCEDURE — 97116 GAIT TRAINING THERAPY: CPT | Performed by: PHYSICAL THERAPIST

## 2022-04-14 PROCEDURE — 97530 THERAPEUTIC ACTIVITIES: CPT | Performed by: PHYSICAL THERAPIST

## 2022-04-14 PROCEDURE — 97110 THERAPEUTIC EXERCISES: CPT | Performed by: PHYSICAL THERAPIST

## 2022-04-14 NOTE — PROGRESS NOTES
Re-Assessment / Re-Certification        Patient: Larry Walters   : 1960  Diagnosis/ICD-10 Code:  Impaired functional mobility, balance, gait, and endurance [Z74.09]  Referring practitioner: Nikki Mcgarry MD  Date of Initial Evaluation:  Type: THERAPY  Noted: 2021  Patient seen for 40 sessions      Subjective:   Larry Walters reports: New compression socks as recommended by Dr. Mcgarry. States Dr. Regan had to cancel appt due to emergency surgery, so he rescheduled for .  Both shoulders are sore. Rates shoulder pain 9-10/10. No low back pain currently. Doing bridges at night to crack back. Denies knee pain.  Doing HEP daily.  Subjective Questionnaire: QuickDASH: 54.55  LEFS: 42/80  Clinical Progress: unchanged  Home Program Compliance: Yes  Treatment has included: therapeutic exercise, manual therapy, therapeutic activity and gait training    Subjective   Objective   Active Range of Motion   Left Shoulder   Flexion: 132  Abduction: 130  External rotation BTH: Base of occiput  Internal rotation BTB: T12    Right Shoulder   Flexion: 108 Pain  Abduction: 108   External rotation BTH: Base of occiput  Internal rotation BTB: L1     Lumbar  Flexion: WNL  Extension: 0  SB: WNL  Rotation: 50% WNL     Strength  L shoulder ER 3-/5      Functional Assessment      Comments  5x STS: 27s no UEs (unchanged from 3/3/22)  TUs unassisted (unchanged from 3/3/22)      New Goals 22  Short Term Goals: 4 weeks. Patient will:  1. Be compliant w/ HEP 4x/week MET  2. Demonstrate step length WNL using cane w/o verbal cuing MET  3. Report pain </= 2/10 w/ ADLs NOT MET    Long Term Goals: 8-12 weeks. Patient will:  1. Demonstrate upper extremity AROM WFL to allow for return to household & recreational activities w/o increased symptoms   2. Score w/in age matched norms for 5x STS and TUG functional testing using cane demonstrating improved gait speed and endurance   3. Perceived disability </= 25% as measured by LEFS and Quick  DASH  4. Be independent with long term HEP   ALL NOT MET     Assessment/Plan  Progress toward previous goals: Not Met    Unfortunately, shoulder pain, AROM, and strength unchanged. No change in functional testing. Recommend follow up w/ provider re: shoulder pain.      Recommendations: Continue as planned  Timeframe: 1 month  Prognosis to achieve goals: poor    PT Signature: Tanna Dick PT      Based upon review of the patient's progress and continued therapy plan, it is my medical opinion that Larry Walters should continue physical therapy treatment at [facility] PH[facility]  [address] [place] [id number] [phone].    Signature: __________________________________  Nikki Mcgarry MD    Manual Therapy:    0     mins  52802;  Therapeutic Exercise:    20     mins  04295;     Neuromuscular Ryne:    0    mins  08037;    Therapeutic Activity:     10     mins  35499;     Gait Training:      10     mins  02831;     Ultrasound:     0     mins  64913;    Work Hardening           0      mins 51438  Iontophoresis               0   mins 03186    Timed Treatment:   40   mins   Total Treatment:     40   mins

## 2022-04-21 ENCOUNTER — TREATMENT (OUTPATIENT)
Dept: PHYSICAL THERAPY | Facility: CLINIC | Age: 62
End: 2022-04-21

## 2022-04-21 DIAGNOSIS — G89.29 CHRONIC LEFT SHOULDER PAIN: ICD-10-CM

## 2022-04-21 DIAGNOSIS — G89.29 CHRONIC RIGHT SHOULDER PAIN: ICD-10-CM

## 2022-04-21 DIAGNOSIS — R26.2 DIFFICULTY WALKING: ICD-10-CM

## 2022-04-21 DIAGNOSIS — M25.512 CHRONIC LEFT SHOULDER PAIN: ICD-10-CM

## 2022-04-21 DIAGNOSIS — Z74.09 IMPAIRED FUNCTIONAL MOBILITY, BALANCE, GAIT, AND ENDURANCE: Primary | ICD-10-CM

## 2022-04-21 DIAGNOSIS — M54.50 MIDLINE LOW BACK PAIN WITHOUT SCIATICA, UNSPECIFIED CHRONICITY: ICD-10-CM

## 2022-04-21 DIAGNOSIS — M25.511 CHRONIC RIGHT SHOULDER PAIN: ICD-10-CM

## 2022-04-21 PROCEDURE — 97530 THERAPEUTIC ACTIVITIES: CPT | Performed by: PHYSICAL THERAPIST

## 2022-04-21 PROCEDURE — 97110 THERAPEUTIC EXERCISES: CPT | Performed by: PHYSICAL THERAPIST

## 2022-04-21 NOTE — PROGRESS NOTES
Physical Therapy Daily Treatment Note      Patient: Larry Walters   : 1960  Referring practitioner: Nikki Mcgarry MD  Date of Initial Visit: Type: THERAPY  Noted: 2021  Today's Date: 2022  Patient seen for 41 sessions       Visit Diagnoses:    ICD-10-CM ICD-9-CM   1. Impaired functional mobility, balance, gait, and endurance  Z74.09 V49.89   2. Chronic right shoulder pain  M25.511 719.41    G89.29 338.29   3. Chronic left shoulder pain  M25.512 719.41    G89.29 338.29   4. Midline low back pain without sciatica, unspecified chronicity  M54.50 724.2   5. Difficulty walking  R26.2 719.7       Subjective   No pain. I've been walking and stretching more at home. Still wearing compression socks but not today.    Objective   See Exercise, Manual, and Modality Logs for complete treatment.     Assessment/Plan   Drastically improved subjective pain report. No complaints of pain with exercise. Progress per POC.      Timed:         Manual Therapy:    0     mins  72464;     Therapeutic Exercise:    30     mins  77917;     Neuromuscular Ryne:    0    mins  60309;    Therapeutic Activity:     10     mins  33305;     Gait Trainin     mins  97340;     Ultrasound:     0     mins  87192;    Ionto                               0    mins   03730        Timed Treatment:   40   mins   Total Treatment:     40   mins    Tanna Dick PT  [place] License: [id number]

## 2022-06-09 ENCOUNTER — TREATMENT (OUTPATIENT)
Dept: PHYSICAL THERAPY | Facility: CLINIC | Age: 62
End: 2022-06-09

## 2022-06-09 DIAGNOSIS — G89.29 CHRONIC RIGHT SHOULDER PAIN: ICD-10-CM

## 2022-06-09 DIAGNOSIS — M25.511 CHRONIC RIGHT SHOULDER PAIN: ICD-10-CM

## 2022-06-09 DIAGNOSIS — M25.512 CHRONIC LEFT SHOULDER PAIN: ICD-10-CM

## 2022-06-09 DIAGNOSIS — G89.29 CHRONIC LEFT SHOULDER PAIN: ICD-10-CM

## 2022-06-09 DIAGNOSIS — Z74.09 IMPAIRED FUNCTIONAL MOBILITY, BALANCE, GAIT, AND ENDURANCE: Primary | ICD-10-CM

## 2022-06-09 PROCEDURE — 97164 PT RE-EVAL EST PLAN CARE: CPT | Performed by: PHYSICAL THERAPIST

## 2022-06-09 PROCEDURE — 97110 THERAPEUTIC EXERCISES: CPT | Performed by: PHYSICAL THERAPIST

## 2022-06-09 NOTE — PROGRESS NOTES
Re-Evaluation        Patient: Larry Walters   : 1960  Diagnosis/ICD-10 Code:  Impaired functional mobility, balance, gait, and endurance [Z74.09]  Referring practitioner: No ref. provider found  Date of Initial Evaluation:  Type: THERAPY  Noted: 2021  Patient seen for 42 sessions      Subjective:   Larry Walters reports: Both shoulders injected 22. R shoulder 100% better. A little tender lifting coffee cup. L shoulder 5% better. Pain with outstretched and overhead reach.  Using L sided quad cane at all times.   Working with occupational therapist for 3hrs at a time, 1x/week. Tries to walk 30 laps (hallway x50ft) with OT.   Tried to take a bath last Thursday, slipped and hit back on tub. Did not fall all of the way to the ground. Was able to get back into tub with help of guardian.   Denies back pain.  Denies knee pain.    Subjective Questionnaire: QuickDASH: 56.82  Oswestry: 17100  Home Program Compliance: Yes    Subjective   Objective          Observations     Additional Shoulder Observation Details  Contusions R anterior shoulder, R iliac crest    Tenderness     Left Shoulder   Tenderness in the biceps tendon (proximal), infraspinatus tendon, subacromial bursa and supraspinatus tendon.     Active Range of Motion   Left Shoulder   Flexion: 124 degrees   Abduction: 93 degrees with pain  External rotation BTH: Active external rotation behind the head: To occiput.   Internal rotation BTB: L1     Right Shoulder   Flexion: 100 degrees   Abduction: 80 degrees   External rotation BTH: Active external rotation behind the head: To occiput.   Internal rotation BTB: L1     Strength/Myotome Testing     Left Shoulder     Planes of Motion   Flexion: 5   Abduction: 5   External rotation at 0°: 3+   Internal rotation at 0°: 5     Right Shoulder     Planes of Motion   Flexion: 5   Abduction: 5   External rotation at 0°: 4   Internal rotation at 0°: 5     Tests     Left Shoulder   Positive Hawkin's and Neer's.   Negative  "empty can and full can.     Right Shoulder   Negative Hawkin's.           Functional Assessment      Comments  5x STS: 30s  TUs w/ quad cane L      Updated Goals 22  Short Term Goals: 4 weeks. Patient will:  1. Be compliant w/ HEP 4x/week   2. Improve 5x STS >/=5s demonstrating improved functional strength  3. Report pain >/= 25% decrease in L shoulder pain    Long Term Goals: 8-12 weeks. Patient will:  1. Demonstrate upper extremity AROM WFL to allow for return to household & recreational activities w/o increased pain  2. Score w/in age matched norms for 5x STS and TUG functional testing using cane demonstrating improved gait speed and endurance   3. Perceived disability </= 25% as measured by Oswestry and Quick DASH  4. Be independent with long term HEP   ALL NOT MET    Assessment/Plan   Pt presents w/ bilateral shoulder pain and impaired gait. Objective findings include limited shoulder AROM (B), pain L shoulder AROM, UE weakness, impaired gait, and decreased speed on functional tests. Pt will benefit from skilled PT services in order to address listed impairments and increase tolerance to normal daily activities including ADLs and recreational activities.     Pt rated tingling as \"extreme\" on Quick Dash. When asked about this, pt states he has pins and needles sensation constantly in R 5th digit x a few weeks. This was not evaluated further as pt did not mention this during evaluation.      Recommendations: Continue with recommendations 2x/week  Timeframe: 12 weeks  Prognosis to achieve goals: fair    PT Signature: Tanna Dick, PT      Based upon review of the patient's progress and continued therapy plan, it is my medical opinion that Larry Walters should continue physical therapy treatment at [facility] PH[facility]  [address] [place] [id number] [phone].    Signature: __________________________________      Manual Therapy:    0     mins  " 77421;  Therapeutic Exercise:    25     mins  69009;     Neuromuscular Ryne:    0    mins  87116;    Therapeutic Activity:     10     mins  51319;     Gait Trainin     mins  89100;     Ultrasound:     0     mins  21495;    Work Hardening           0      mins 35380  Iontophoresis               0   mins 59938    Timed Treatment:   35   mins   Total Treatment:     45   mins

## 2022-06-09 NOTE — PATIENT INSTRUCTIONS
Access Code: [email or web address]  URL: [email or web address]  Date: 06/09/2022  Prepared by: Tanna Dick    Exercises  Sidelying Shoulder External Rotation AROM - 1 x daily - 7 x weekly - 1 sets - 10 reps - 5s hold  Supine Shoulder Flexion AAROM - 1 x daily - 7 x weekly - 1 sets - 10 reps - 5s hold  Supine Shoulder Horizontal Abduction with Resistance - 1 x daily - 7 x weekly - 1 sets - 10 reps  Standing Row with Anchored Resistance - 1 x daily - 7 x weekly - 1 sets - 30 reps

## 2022-06-23 ENCOUNTER — TREATMENT (OUTPATIENT)
Dept: PHYSICAL THERAPY | Facility: CLINIC | Age: 62
End: 2022-06-23

## 2022-06-23 DIAGNOSIS — G89.29 CHRONIC RIGHT SHOULDER PAIN: ICD-10-CM

## 2022-06-23 DIAGNOSIS — M25.511 CHRONIC RIGHT SHOULDER PAIN: ICD-10-CM

## 2022-06-23 DIAGNOSIS — G89.29 CHRONIC LEFT SHOULDER PAIN: ICD-10-CM

## 2022-06-23 DIAGNOSIS — Z74.09 IMPAIRED FUNCTIONAL MOBILITY, BALANCE, GAIT, AND ENDURANCE: Primary | ICD-10-CM

## 2022-06-23 DIAGNOSIS — M25.512 CHRONIC LEFT SHOULDER PAIN: ICD-10-CM

## 2022-06-23 PROCEDURE — 97110 THERAPEUTIC EXERCISES: CPT | Performed by: PHYSICAL THERAPIST

## 2022-06-23 PROCEDURE — 97140 MANUAL THERAPY 1/> REGIONS: CPT | Performed by: PHYSICAL THERAPIST

## 2022-06-23 NOTE — PROGRESS NOTES
Physical Therapy Daily Treatment Note      Patient: Larry Walters   : 1960  Referring practitioner: Nikki Mcgarry MD  Date of Initial Visit: Type: THERAPY  Noted: 2021  Today's Date: 2022  Patient seen for 43 sessions       Visit Diagnoses:    ICD-10-CM ICD-9-CM   1. Impaired functional mobility, balance, gait, and endurance  Z74.09 V49.89   2. Chronic right shoulder pain  M25.511 719.41    G89.29 338.29   3. Chronic left shoulder pain  M25.512 719.41    G89.29 338.29       Subjective   Knee injected last Thursday. Some relief. Only c/o LBP. 0/10 when sitting. 7/10 when standing >5 mins.    Objective   See Exercise, Manual, and Modality Logs for complete treatment.     Assessment/Plan  Pain w/ L shoulder PROM overhead. Some improvement with caudal and posterior joint mobilization. Updated HEP with passive ER stretch. Progress per POC.    Timed:         Manual Therapy:    10     mins  93165;     Therapeutic Exercise:    30     mins  57961;     Neuromuscular Ryne:    0    mins  80778;    Therapeutic Activity:     0     mins  04675;     Gait Trainin     mins  35284;     Ultrasound:     0     mins  03352;    Ionto                               0    mins   02164        Timed Treatment:   40   mins   Total Treatment:     40   mins    ADRIANNA Morales License: [id number]

## 2022-06-30 ENCOUNTER — TREATMENT (OUTPATIENT)
Dept: PHYSICAL THERAPY | Facility: CLINIC | Age: 62
End: 2022-06-30

## 2022-06-30 DIAGNOSIS — R26.2 DIFFICULTY WALKING: ICD-10-CM

## 2022-06-30 DIAGNOSIS — G89.29 CHRONIC LEFT SHOULDER PAIN: ICD-10-CM

## 2022-06-30 DIAGNOSIS — M25.511 CHRONIC RIGHT SHOULDER PAIN: ICD-10-CM

## 2022-06-30 DIAGNOSIS — M54.50 MIDLINE LOW BACK PAIN WITHOUT SCIATICA, UNSPECIFIED CHRONICITY: ICD-10-CM

## 2022-06-30 DIAGNOSIS — G89.29 CHRONIC RIGHT SHOULDER PAIN: ICD-10-CM

## 2022-06-30 DIAGNOSIS — M25.512 CHRONIC LEFT SHOULDER PAIN: ICD-10-CM

## 2022-06-30 DIAGNOSIS — Z74.09 IMPAIRED FUNCTIONAL MOBILITY, BALANCE, GAIT, AND ENDURANCE: Primary | ICD-10-CM

## 2022-06-30 PROCEDURE — 97140 MANUAL THERAPY 1/> REGIONS: CPT | Performed by: PHYSICAL THERAPIST

## 2022-06-30 PROCEDURE — 97110 THERAPEUTIC EXERCISES: CPT | Performed by: PHYSICAL THERAPIST

## 2022-06-30 PROCEDURE — 97530 THERAPEUTIC ACTIVITIES: CPT | Performed by: PHYSICAL THERAPIST

## 2022-06-30 NOTE — PROGRESS NOTES
Physical Therapy Daily Treatment Note      Patient: Larry Walters   : 1960  Referring practitioner: Nikki Mcgarry MD  Date of Initial Visit: Type: THERAPY  Noted: 2021  Today's Date: 2022  Patient seen for 44 sessions       Visit Diagnoses:    ICD-10-CM ICD-9-CM   1. Impaired functional mobility, balance, gait, and endurance  Z74.09 V49.89   2. Chronic right shoulder pain  M25.511 719.41    G89.29 338.29   3. Chronic left shoulder pain  M25.512 719.41    G89.29 338.29   4. Midline low back pain without sciatica, unspecified chronicity  M54.50 724.2   5. Difficulty walking  R26.2 719.7       Subjective   No new changes    Objective   See Exercise, Manual, and Modality Logs for complete treatment.       Assessment/Plan  No c/o pain w/ exercises. Subjective improvements in L shoulder ER mobility. Progress per POC.    Timed:         Manual Therapy:    10     mins  60767;     Therapeutic Exercise:    20     mins  90198;     Neuromuscular Ryne:    0    mins  30275;    Therapeutic Activity:     10     mins  61659;     Gait Trainin     mins  59957;     Ultrasound:     0     mins  99190;    Ionto                               0    mins   09152        Timed Treatment:   40   mins   Total Treatment:     40   mins    Tanna Dick PT  [place] License: [id number]

## 2022-07-07 ENCOUNTER — TREATMENT (OUTPATIENT)
Dept: PHYSICAL THERAPY | Facility: CLINIC | Age: 62
End: 2022-07-07

## 2022-07-07 DIAGNOSIS — G89.29 CHRONIC RIGHT SHOULDER PAIN: ICD-10-CM

## 2022-07-07 DIAGNOSIS — M54.50 MIDLINE LOW BACK PAIN WITHOUT SCIATICA, UNSPECIFIED CHRONICITY: ICD-10-CM

## 2022-07-07 DIAGNOSIS — M25.511 CHRONIC RIGHT SHOULDER PAIN: ICD-10-CM

## 2022-07-07 DIAGNOSIS — R26.2 DIFFICULTY WALKING: ICD-10-CM

## 2022-07-07 DIAGNOSIS — G89.29 CHRONIC LEFT SHOULDER PAIN: ICD-10-CM

## 2022-07-07 DIAGNOSIS — Z74.09 IMPAIRED FUNCTIONAL MOBILITY, BALANCE, GAIT, AND ENDURANCE: Primary | ICD-10-CM

## 2022-07-07 DIAGNOSIS — M25.512 CHRONIC LEFT SHOULDER PAIN: ICD-10-CM

## 2022-07-07 PROCEDURE — 97530 THERAPEUTIC ACTIVITIES: CPT | Performed by: PHYSICAL THERAPIST

## 2022-07-07 PROCEDURE — 97110 THERAPEUTIC EXERCISES: CPT | Performed by: PHYSICAL THERAPIST

## 2022-07-07 PROCEDURE — 97140 MANUAL THERAPY 1/> REGIONS: CPT | Performed by: PHYSICAL THERAPIST

## 2022-07-07 NOTE — PROGRESS NOTES
Physical Therapy Daily Treatment Note      Patient: Larry Walters   : 1960  Referring practitioner: Nikki Mcgarry MD  Date of Initial Visit: Type: THERAPY  Noted: 2021  Today's Date: 2022  Patient seen for 45 sessions       Visit Diagnoses:    ICD-10-CM ICD-9-CM   1. Impaired functional mobility, balance, gait, and endurance  Z74.09 V49.89   2. Chronic right shoulder pain  M25.511 719.41    G89.29 338.29   3. Chronic left shoulder pain  M25.512 719.41    G89.29 338.29   4. Midline low back pain without sciatica, unspecified chronicity  M54.50 724.2   5. Difficulty walking  R26.2 719.7       Subjective   Better. Shoulders are less stiff and less painful. Still lacking motion. I have trouble reaching across body with right hand. My left shoulder is useless. Pt reports he has plans for shoulder surgery (B) but has not scheduled date.    Objective   See Exercise, Manual, and Modality Logs for complete treatment.     Assessment/Plan  R shoulder PROM limited by joint > soft tissue. Slight improvements in AROM after manual interventions. No c/o shoulder pain with exercise. Fatigued with 3x10 body weight shoulder raise. Progress per POC.    Timed:         Manual Therapy:    15     mins  21089;     Therapeutic Exercise:    20     mins  68453;     Neuromuscular Ryne:    0    mins  43495;    Therapeutic Activity:     10     mins  19148;     Gait Trainin     mins  07304;     Ultrasound:     0     mins  46493;    Ionto                               0    mins   11977        Timed Treatment:   45   mins   Total Treatment:     45   mins    Tanna Dick PT  [place] License: [id number]

## 2022-07-14 ENCOUNTER — TREATMENT (OUTPATIENT)
Dept: PHYSICAL THERAPY | Facility: CLINIC | Age: 62
End: 2022-07-14

## 2022-07-14 DIAGNOSIS — G89.29 CHRONIC LEFT SHOULDER PAIN: ICD-10-CM

## 2022-07-14 DIAGNOSIS — G89.29 CHRONIC RIGHT SHOULDER PAIN: ICD-10-CM

## 2022-07-14 DIAGNOSIS — R26.2 DIFFICULTY WALKING: ICD-10-CM

## 2022-07-14 DIAGNOSIS — M54.50 MIDLINE LOW BACK PAIN WITHOUT SCIATICA, UNSPECIFIED CHRONICITY: ICD-10-CM

## 2022-07-14 DIAGNOSIS — Z74.09 IMPAIRED FUNCTIONAL MOBILITY, BALANCE, GAIT, AND ENDURANCE: Primary | ICD-10-CM

## 2022-07-14 DIAGNOSIS — M25.512 CHRONIC LEFT SHOULDER PAIN: ICD-10-CM

## 2022-07-14 DIAGNOSIS — M25.511 CHRONIC RIGHT SHOULDER PAIN: ICD-10-CM

## 2022-07-14 PROCEDURE — 97530 THERAPEUTIC ACTIVITIES: CPT | Performed by: PHYSICAL THERAPIST

## 2022-07-14 PROCEDURE — 97110 THERAPEUTIC EXERCISES: CPT | Performed by: PHYSICAL THERAPIST

## 2022-07-14 NOTE — PROGRESS NOTES
Re-Assessment / Re-Certification        Patient: Larry Walters   : 1960  Diagnosis/ICD-10 Code:  Impaired functional mobility, balance, gait, and endurance [Z74.09]  Referring practitioner: Nikki Mcgarry MD  Date of Initial Evaluation:  Type: THERAPY  Noted: 2021  Patient seen for 46 sessions      Subjective:   Larry Walters reports: Both shoulders are still bothersome. Low back pain x1 week. Plan for lumbar injections w/ Regan 22. Rates shoulder and back pain 6/10. Better overall.  Subjective Questionnaire: Quick DASH: 54.55 (Unchanged), Oswestry 32/100 (Worsened)  Clinical Progress: See above  Home Program Compliance: Yes  Treatment has included: therapeutic exercise, manual therapy and therapeutic activity    Subjective   Objective      Active Range of Motion   Left Shoulder   Flexion: 132 degrees   Abduction: 110 degrees   External rotation BTH: To occiput/C7  Internal rotation BTB: L1     Right Shoulder   Flexion: 115 degrees   Abduction: 84 degrees   External rotation BTH: To occiput.   Internal rotation BTB: L1      Strength/Myotome Testing     Left Shoulder      Planes of Motion   Flexion: 5   Abduction: 5   External rotation at 0°: 3+   Internal rotation at 0°: 5     Right Shoulder      Planes of Motion   Flexion: 5   Abduction: 5   External rotation at 0°: 4   Internal rotation at 0°: 5      Tests      Left Shoulder   Positive Bobo'sSavanah's     Right Shoulder   Positive Bobo'sSavanah's     Functional Assessment      Comments  5x STS: 28s  TUs w/ quad cane L      Updated Goals 22  Short Term Goals: 4 weeks. Patient will:  1. Be compliant w/ HEP 4x/week MET  2. Improve 5x STS >/=5s demonstrating improved functional strength UNMET  3. Report pain >/= 25% decrease in L shoulder pain UNMET    Long Term Goals: 8-12 weeks. Patient will:  1. Demonstrate upper extremity AROM WFL to allow for return to household & recreational activities w/o increased pain  2. Score w/in age matched norms for  5x STS and TUG functional testing using cane demonstrating improved gait speed and endurance   3. Perceived disability </= 25% as measured by Oswestry and Quick DASH  4. Be independent with long term HEP   ALL NOT MET     Assessment/Plan  Progress toward previous goals: Not Met    Slight improvements in overhead reach B. No significant change in functional testing or strength. Continues to have significantly limited AROM, ER strength, and positive testing for impingement B.      Recommendations: Continue as planned  Timeframe: 2 months  Prognosis to achieve goals: fair    PT Signature: Tanna Dcik PT      Based upon review of the patient's progress and continued therapy plan, it is my medical opinion that Larry Walters should continue physical therapy treatment at [facility]  [address] [place] [id number] [phone].    Signature: __________________________________  Nikki Mcgarry MD    Manual Therapy:    0     mins  11553;  Therapeutic Exercise:    30     mins  51987;     Neuromuscular Ryne:    0    mins  80345;    Therapeutic Activity:     10     mins  01625;     Gait Trainin     mins  49645;     Ultrasound:     0     mins  52676;    Work Hardening           0      mins 28810  Iontophoresis               0   mins 20858    Timed Treatment:   40   mins   Total Treatment:     40   mins

## 2022-07-21 ENCOUNTER — TREATMENT (OUTPATIENT)
Dept: PHYSICAL THERAPY | Facility: CLINIC | Age: 62
End: 2022-07-21

## 2022-07-21 DIAGNOSIS — R26.2 DIFFICULTY WALKING: ICD-10-CM

## 2022-07-21 DIAGNOSIS — G89.29 CHRONIC LEFT SHOULDER PAIN: ICD-10-CM

## 2022-07-21 DIAGNOSIS — M25.511 CHRONIC RIGHT SHOULDER PAIN: ICD-10-CM

## 2022-07-21 DIAGNOSIS — M25.512 CHRONIC LEFT SHOULDER PAIN: ICD-10-CM

## 2022-07-21 DIAGNOSIS — G89.29 CHRONIC RIGHT SHOULDER PAIN: ICD-10-CM

## 2022-07-21 DIAGNOSIS — Z74.09 IMPAIRED FUNCTIONAL MOBILITY, BALANCE, GAIT, AND ENDURANCE: Primary | ICD-10-CM

## 2022-07-21 DIAGNOSIS — M54.50 MIDLINE LOW BACK PAIN WITHOUT SCIATICA, UNSPECIFIED CHRONICITY: ICD-10-CM

## 2022-07-21 PROCEDURE — 97110 THERAPEUTIC EXERCISES: CPT | Performed by: PHYSICAL THERAPIST

## 2022-07-21 PROCEDURE — 97530 THERAPEUTIC ACTIVITIES: CPT | Performed by: PHYSICAL THERAPIST

## 2022-07-21 NOTE — PROGRESS NOTES
Physical Therapy Daily Treatment Note      Patient: Larry Walters   : 1960  Referring practitioner: Nikki Mcgarry MD  Date of Initial Visit: Type: THERAPY  Noted: 2021  Today's Date: 2022  Patient seen for 47 sessions       Visit Diagnoses:    ICD-10-CM ICD-9-CM   1. Impaired functional mobility, balance, gait, and endurance  Z74.09 V49.89   2. Chronic right shoulder pain  M25.511 719.41    G89.29 338.29   3. Chronic left shoulder pain  M25.512 719.41    G89.29 338.29   4. Midline low back pain without sciatica, unspecified chronicity  M54.50 724.2   5. Difficulty walking  R26.2 719.7       Subjective   No new changes.    Objective   See Exercise, Manual, and Modality Logs for complete treatment.       Assessment/Plan  Pt declined adding 1lb weight to shoulder raises due to shoulder pain, however, he could not identify a bothersome exercise. Continues to have limited shoulder AROM B/flexed posture/impaired gait. Progress per POC.    Timed:         Manual Therapy:    0     mins  82169;     Therapeutic Exercise:    25     mins  03153;     Neuromuscular Ryne:    0    mins  31092;    Therapeutic Activity:     15     mins  48088;     Gait Trainin     mins  67597;     Ultrasound:     0     mins  05004;    Ionto                               0    mins   76557        Timed Treatment:   40   mins   Total Treatment:     40   mins    Tanna Dick PT  [place] License: [id number]

## 2022-08-04 ENCOUNTER — TREATMENT (OUTPATIENT)
Dept: PHYSICAL THERAPY | Facility: CLINIC | Age: 62
End: 2022-08-04

## 2022-08-04 DIAGNOSIS — G89.29 CHRONIC LEFT SHOULDER PAIN: ICD-10-CM

## 2022-08-04 DIAGNOSIS — Z74.09 IMPAIRED FUNCTIONAL MOBILITY, BALANCE, GAIT, AND ENDURANCE: Primary | ICD-10-CM

## 2022-08-04 DIAGNOSIS — R26.2 DIFFICULTY WALKING: ICD-10-CM

## 2022-08-04 DIAGNOSIS — M54.50 MIDLINE LOW BACK PAIN WITHOUT SCIATICA, UNSPECIFIED CHRONICITY: ICD-10-CM

## 2022-08-04 DIAGNOSIS — M25.511 CHRONIC RIGHT SHOULDER PAIN: ICD-10-CM

## 2022-08-04 DIAGNOSIS — M25.512 CHRONIC LEFT SHOULDER PAIN: ICD-10-CM

## 2022-08-04 DIAGNOSIS — G89.29 CHRONIC RIGHT SHOULDER PAIN: ICD-10-CM

## 2022-08-04 PROCEDURE — 97110 THERAPEUTIC EXERCISES: CPT | Performed by: PHYSICAL THERAPIST

## 2022-08-04 PROCEDURE — 97530 THERAPEUTIC ACTIVITIES: CPT | Performed by: PHYSICAL THERAPIST

## 2022-08-04 PROCEDURE — 97112 NEUROMUSCULAR REEDUCATION: CPT | Performed by: PHYSICAL THERAPIST

## 2022-08-04 NOTE — PROGRESS NOTES
"Physical Therapy Daily Treatment Note      Patient: Larry Walters   : 1960  Referring practitioner: Nikki Mcgarry MD  Date of Initial Visit: Type: THERAPY  Noted: 2021  Today's Date: 2022  Patient seen for 48 sessions       Visit Diagnoses:    ICD-10-CM ICD-9-CM   1. Impaired functional mobility, balance, gait, and endurance  Z74.09 V49.89   2. Chronic right shoulder pain  M25.511 719.41    G89.29 338.29   3. Chronic left shoulder pain  M25.512 719.41    G89.29 338.29   4. Midline low back pain without sciatica, unspecified chronicity  M54.50 724.2   5. Difficulty walking  R26.2 719.7       Subjective   Shoulders are \"pretty good\". Rates pain 5-6/10. R shoulder more painful than left.    Objective   See Exercise, Manual, and Modality Logs for complete treatment.     Assessment/Plan  Minimal improvement in B shoulder AROM. Upright posture improves with verbal cuing. No c/o increased pain w/ new exercises. Progress per POC.    Timed:         Manual Therapy:    0     mins  95011;     Therapeutic Exercise:    15     mins  29760;     Neuromuscular Ryne:    10    mins  97294;    Therapeutic Activity:     15     mins  23557;     Gait Trainin     mins  84527;     Ultrasound:     0     mins  69691;    Ionto                               0    mins   02879        Timed Treatment:   40   mins   Total Treatment:     50   mins    Tanna Dick, PT  [place] License: [id number]  "

## 2022-08-11 ENCOUNTER — TREATMENT (OUTPATIENT)
Dept: PHYSICAL THERAPY | Facility: CLINIC | Age: 62
End: 2022-08-11

## 2022-08-11 DIAGNOSIS — G89.29 CHRONIC RIGHT SHOULDER PAIN: ICD-10-CM

## 2022-08-11 DIAGNOSIS — G89.29 CHRONIC LEFT SHOULDER PAIN: ICD-10-CM

## 2022-08-11 DIAGNOSIS — R26.2 DIFFICULTY WALKING: ICD-10-CM

## 2022-08-11 DIAGNOSIS — M54.50 MIDLINE LOW BACK PAIN WITHOUT SCIATICA, UNSPECIFIED CHRONICITY: ICD-10-CM

## 2022-08-11 DIAGNOSIS — M25.512 CHRONIC LEFT SHOULDER PAIN: ICD-10-CM

## 2022-08-11 DIAGNOSIS — M25.511 CHRONIC RIGHT SHOULDER PAIN: ICD-10-CM

## 2022-08-11 DIAGNOSIS — Z74.09 IMPAIRED FUNCTIONAL MOBILITY, BALANCE, GAIT, AND ENDURANCE: Primary | ICD-10-CM

## 2022-08-11 PROCEDURE — 97116 GAIT TRAINING THERAPY: CPT | Performed by: PHYSICAL THERAPIST

## 2022-08-11 PROCEDURE — 97530 THERAPEUTIC ACTIVITIES: CPT | Performed by: PHYSICAL THERAPIST

## 2022-08-11 PROCEDURE — 97110 THERAPEUTIC EXERCISES: CPT | Performed by: PHYSICAL THERAPIST

## 2022-08-11 NOTE — PROGRESS NOTES
"Physical Therapy Daily Treatment Note      Patient: Larry Walters   : 1960  Referring practitioner: Nikki Mcgarry MD  Date of Initial Visit: Type: THERAPY  Noted: 2021  Today's Date: 2022  Patient seen for 49 sessions       Visit Diagnoses:    ICD-10-CM ICD-9-CM   1. Impaired functional mobility, balance, gait, and endurance  Z74.09 V49.89   2. Chronic right shoulder pain  M25.511 719.41    G89.29 338.29   3. Chronic left shoulder pain  M25.512 719.41    G89.29 338.29   4. Midline low back pain without sciatica, unspecified chronicity  M54.50 724.2   5. Difficulty walking  R26.2 719.7       Subjective   \"Can we not use my shoulders today? They're sore.\"    Objective   See Exercise, Manual, and Modality Logs for complete treatment.     Assessment/Plan  Pt agreed to participate in UE exercises and was instructed to communicate re:pain. No c/o pain w/ today's routine. Continues to have significantly limited AROM shoulder ER B.   Continues to have limited balance & tolerance to LE weightbearing.  Progress per POC.    Timed:         Manual Therapy:    0     mins  13749;     Therapeutic Exercise:    15     mins  79394;     Neuromuscular Ryne:    0    mins  62072;    Therapeutic Activity:     15     mins  34407;     Gait Training:      10     mins  02024;     Ultrasound:     0     mins  68521;    Ionto                               0    mins   42592        Timed Treatment:   40   mins   Total Treatment:     45   mins    ADRIANNA Morales License: [id number]  "

## 2022-08-25 ENCOUNTER — TRANSCRIBE ORDERS (OUTPATIENT)
Dept: ADMINISTRATIVE | Facility: HOSPITAL | Age: 62
End: 2022-08-25

## 2022-08-25 DIAGNOSIS — N20.0 CALCULUS, RENAL: Primary | ICD-10-CM

## 2022-09-02 ENCOUNTER — TRANSCRIBE ORDERS (OUTPATIENT)
Dept: PHYSICAL THERAPY | Facility: CLINIC | Age: 62
End: 2022-09-02

## 2022-09-02 DIAGNOSIS — M47.816 LUMBAR SPONDYLOSIS: Primary | ICD-10-CM

## 2022-09-16 ENCOUNTER — HOSPITAL ENCOUNTER (OUTPATIENT)
Dept: CT IMAGING | Facility: HOSPITAL | Age: 62
Discharge: HOME | End: 2022-09-16
Admitting: UROLOGY

## 2022-09-16 DIAGNOSIS — N20.0 CALCULUS, RENAL: ICD-10-CM

## 2022-09-16 PROCEDURE — 74170 CT ABD WO CNTRST FLWD CNTRST: CPT

## 2022-09-16 PROCEDURE — 25010000002 IOPAMIDOL 61 % SOLUTION: Performed by: UROLOGY

## 2022-09-16 RX ADMIN — IOPAMIDOL 100 ML: 612 INJECTION, SOLUTION INTRAVENOUS at 14:00

## 2022-10-13 ENCOUNTER — TREATMENT (OUTPATIENT)
Dept: PHYSICAL THERAPY | Facility: CLINIC | Age: 62
End: 2022-10-13

## 2022-10-13 DIAGNOSIS — Z74.09 IMPAIRED FUNCTIONAL MOBILITY, BALANCE, GAIT, AND ENDURANCE: Primary | ICD-10-CM

## 2022-10-13 DIAGNOSIS — G89.29 CHRONIC MIDLINE LOW BACK PAIN WITHOUT SCIATICA: ICD-10-CM

## 2022-10-13 DIAGNOSIS — M54.50 CHRONIC MIDLINE LOW BACK PAIN WITHOUT SCIATICA: ICD-10-CM

## 2022-10-13 PROCEDURE — 97110 THERAPEUTIC EXERCISES: CPT | Performed by: PHYSICAL THERAPIST

## 2022-10-13 PROCEDURE — 97161 PT EVAL LOW COMPLEX 20 MIN: CPT | Performed by: PHYSICAL THERAPIST

## 2022-10-13 NOTE — PATIENT INSTRUCTIONS
Access Code: [email or web address]  URL: [email or web address]  Date: 10/13/2022  Prepared by: Tanna Dick    Exercises  Supine Bridge - 1 x daily - 7 x weekly - 1 sets - 10 reps  Sit to Stand - 1 x daily - 7 x weekly - 1 sets - 10 reps  Seated Isometric Hip Adduction with Ball - 1 x daily - 7 x weekly - 1 sets - 10 reps - 5s hold  Seated March - 1 x daily - 7 x weekly - 1 sets - 10 reps - 3s hold  Seated Diaphragmatic Breathing - 1 x daily - 7 x weekly - 1 sets - 10 reps  Seated Long Arc Quad/Sciatic Nerve Glide - 1 x daily - 7 x weekly - 1 sets - 10 reps  Side Stepping with Counter Support - 1 x daily - 7 x weekly - 3 sets - 5 reps

## 2022-10-13 NOTE — PROGRESS NOTES
"  Physical Therapy Initial Evaluation and Plan of Care    Patient: Larry Walters   : 1960  Diagnosis/ICD-10 Code:  Impaired functional mobility, balance, gait, and endurance [Z74.09]  Referring practitioner: Eric Regan MD  Date of Initial Visit: 10/13/2022  Today's Date: 10/13/2022  Patient seen for 1 sessions           Subjective Questionnaire: Oswestry: 44/100      Subjective Evaluation    History of Present Illness  Mechanism of injury: Lumbar epidural Friday without relief. Plan to return 10/22/22 for repeat epidural. If no relief, plan for ablation. No back pain sleeping or sitting. I can get in/out of bath fine. Back pain immediately upon standing. No pain with first steps in the morning. Pain walking after prolonged sitting. States he exercises 3x/day: 10 sit to stands, shoulder stretching, bridges. I don't do much walking. On  I walk to and from car to OT.  Taking 800 mg ibuprofen up to 4x/day, prn. Typically taking 2x/day  Plan for R TKA and B shoulder replacements in future  Hx C3-7 fusion 1998  No lumbar Sx    Xray 19 \"FINDINGS: Lateral flexion and extension views of the lumbar spine are submitted for interpretation. Overall, the lumbar vertebral body heights are well-maintained. There is prominent disc space narrowing and degenerative endplate changes and endplate spurring at L1-L2, L2-L3, to a lesser degree L3-L4 and L4-L5 and L5-S1. There is evidence of facet arthropathy from L1 to S1. No abnormal motion seen in lumbar spine with flexion or extension.\"          Patient Occupation: N/A Pain  Current pain ratin  At best pain ratin  At worst pain ratin  Location: Low back  Quality: dull ache and cramping  Relieving factors: medications (Sitting)  Aggravating factors: ambulation and standing  Progression: improved    Social Support  Lives in: apartment  Lives with: significant other    Diagnostic Tests  X-ray: abnormal    Treatments  Previous treatment: physical " therapy and injection treatment  Current treatment: physical therapy  Patient Goals  Patient goals for therapy: decreased pain and independence with ADLs/IADLs  Patient goal: Improve gait speed           Objective          Static Posture     Knee   Genu valgus.     Palpation   Left   Tenderness of the quadratus lumborum.     Right Tenderness of the quadratus lumborum.     Tenderness     Lumbar Spine  Tenderness in the spinous process (T8-12).     Active Range of Motion     Lumbar   Flexion: WFL  Extension: 0 (Pain L anterior hip) degrees     Additional Active Range of Motion Details  Lateral flexion 50% WNL B  Rotation <25% WNL B    Strength/Myotome Testing     Left Hip   Planes of Motion   Flexion: 4  Abduction: 4-    Right Hip   Planes of Motion   Flexion: 4+  Right hip abductors strength: Unable to roll to L side on treatment table.    Left Knee   Flexion: 5  Extension: 5    Right Knee   Flexion: 5  Extension: 5    Left Ankle/Foot   Dorsiflexion: 5    Right Ankle/Foot   Dorsiflexion: 5    Muscle Activation   Patient unable to activate left transverse abdominals and right transverse abdominals.     Tests     Lumbar     Left   Positive passive SLR (45deg lower leg pain).     Right   Negative passive SLR.     Additional Tests Details  LONI L: bilateral LBP R: R LBP    Lumbar Flexibility Comments:   Mild long hamstrings B    Ambulation   Weight-Bearing Status   Assistive device used: quad cane    Ambulation: Level Surfaces   Ambulation with assistive device: independent  Ambulation without assistive device: SBA.    Ambulation: Stairs   Ascend stairs: unable  Descend stairs: unable    Observational Gait   Decreased walking speed and stride length.     Functional Assessment     Comments  5x STS: 34s  TUG: (w/ quad cane): 26s    Bed mobility: min A, unable to assume side lying on treatment table          Assessment/Plan     Assessment  Impairments: abnormal coordination, abnormal gait, abnormal muscle firing, abnormal  muscle tone, abnormal or restricted ROM, activity intolerance, impaired balance, impaired physical strength, lacks appropriate home exercise program, pain with function and safety issue  Assessment details: Pt presents w/ chronic low back pain, impaired gait and bed mobility, LE weakness, and positive special testing.  Pt will benefit from skilled PT services in order to address listed impairments and increase tolerance to normal daily activities including ADLs and recreational activities.    Prognosis: fair, multi-joint OA, several bouts of PT w/o lasting improvement  Functional Limitations: carrying objects, lifting, walking, pulling, pushing, moving in bed, standing, stooping, reaching behind back, reaching overhead and unable to perform repetitive tasks    Goals  Plan Goals: Short Term Goals: 4 weeks. Patient will:  1. Be independent with initial HEP  2. Demo improved standing and walking tolerance >/=5 mins w/o limitation due to pain  3. Demo transverse abdominis activation independently w/ exercise  4. Self-report >10% improvement on Oswestry    Long Term Goals: 6-12 weeks. Patient will:  1. Demonstrate improved Bilateral lower extremity MMT of >/= 4+/5 to allow for performance of daily activities without pain.  2. Demonstrate lumbar AROM WFL to allow for return to household & recreational activities w/o increased symptoms  3. Score w/in age matched norms for 5x STS and TUG functional testing demonstrating improved gait speed and endurance  4. Perceived disability </= 25% as measured by Oswestry  5. Be independent with long term HEP     Plan  Therapy options: will be seen for skilled physical therapy services  Planned modality interventions: cryotherapy, electrical stimulation/Russian stimulation and thermotherapy (hydrocollator packs)  Planned therapy interventions: abdominal trunk stabilization, ADL retraining, body mechanics training, balance/weight-bearing training, flexibility, functional ROM exercises,  gait training, home exercise program, joint mobilization, manual therapy, neuromuscular re-education, soft tissue mobilization, spinal/joint mobilization, strengthening, stretching and therapeutic activities    Frequency: 2x week  Duration in weeks: 12  Treatment plan discussed with: patient    Manual Therapy:    0     mins  16061;  Therapeutic Exercise:    25     mins  55633;     Neuromuscular Ryne:    0    mins  72279;    Therapeutic Activity:     0     mins  38621;     Gait Trainin     mins  88987;     Ultrasound:     0     mins  37321;    Electrical Stimulation:    0     mins  90635 ( );  Dry Needling     0     mins self-pay    Timed Treatment:   25   mins   Total Treatment:     45   mins    PT SIGNATURE: Tanna Dick PT   DATE TREATMENT INITIATED: 10/13/2022    Initial Certification  Certification Period: 2023  I certify that the therapy services are furnished while this patient is under my care.  The services outlined above are required by this patient, and will be reviewed every 90 days.     PHYSICIAN: Eric Regan MD      DATE:     Please sign and return via fax to [phone].. Thank you, [facility].

## 2022-10-20 ENCOUNTER — TREATMENT (OUTPATIENT)
Dept: PHYSICAL THERAPY | Facility: CLINIC | Age: 62
End: 2022-10-20

## 2022-10-20 DIAGNOSIS — M54.50 CHRONIC MIDLINE LOW BACK PAIN WITHOUT SCIATICA: ICD-10-CM

## 2022-10-20 DIAGNOSIS — Z74.09 IMPAIRED FUNCTIONAL MOBILITY, BALANCE, GAIT, AND ENDURANCE: Primary | ICD-10-CM

## 2022-10-20 DIAGNOSIS — G89.29 CHRONIC MIDLINE LOW BACK PAIN WITHOUT SCIATICA: ICD-10-CM

## 2022-10-20 PROCEDURE — 97110 THERAPEUTIC EXERCISES: CPT | Performed by: PHYSICAL THERAPIST

## 2022-10-20 PROCEDURE — 97116 GAIT TRAINING THERAPY: CPT | Performed by: PHYSICAL THERAPIST

## 2022-10-20 PROCEDURE — 97530 THERAPEUTIC ACTIVITIES: CPT | Performed by: PHYSICAL THERAPIST

## 2022-10-20 NOTE — PROGRESS NOTES
Physical Therapy Daily Treatment Note      Patient: Larry Walters   : 1960  Referring practitioner: Nikki Mcgarry MD  Date of Initial Visit: Type: THERAPY  Noted: 10/13/2022  Today's Date: 10/20/2022  Patient seen for 2 sessions       Visit Diagnoses:    ICD-10-CM ICD-9-CM   1. Impaired functional mobility, balance, gait, and endurance  Z74.09 V49.89   2. Chronic midline low back pain without sciatica  M54.50 724.2    G89.29 338.29       Subjective   No back pain upon entering clinic.    Objective   See Exercise, Manual, and Modality Logs for complete treatment.     Assessment/Plan  Excellent tolerance to therapy w/o c/o pain. Required intermittent use of railing with stance on foam, staggered stance, and toe taps due to limited balance. Required seated rest breaks after ~60s of stance on foam due to fatigue. Progress per POC.    Timed:         Manual Therapy:    0     mins  17247;     Therapeutic Exercise:    10     mins  41005;     Neuromuscular Ryne:    0    mins  32465;    Therapeutic Activity:     30     mins  15329;     Gait Training:      15     mins  88807;     Ultrasound:     0     mins  00345;    Ionto                               0    mins   79767        Timed Treatment:   55   mins   Total Treatment:     55   mins    Tanna Dick PT  [place] License: [id number]

## 2022-11-10 ENCOUNTER — TREATMENT (OUTPATIENT)
Dept: PHYSICAL THERAPY | Facility: CLINIC | Age: 62
End: 2022-11-10

## 2022-11-10 DIAGNOSIS — G89.29 CHRONIC MIDLINE LOW BACK PAIN WITHOUT SCIATICA: ICD-10-CM

## 2022-11-10 DIAGNOSIS — M54.50 CHRONIC MIDLINE LOW BACK PAIN WITHOUT SCIATICA: ICD-10-CM

## 2022-11-10 DIAGNOSIS — Z74.09 IMPAIRED FUNCTIONAL MOBILITY, BALANCE, GAIT, AND ENDURANCE: Primary | ICD-10-CM

## 2022-11-10 PROCEDURE — 97110 THERAPEUTIC EXERCISES: CPT | Performed by: PHYSICAL THERAPIST

## 2022-11-10 PROCEDURE — 97530 THERAPEUTIC ACTIVITIES: CPT | Performed by: PHYSICAL THERAPIST

## 2022-11-10 NOTE — PROGRESS NOTES
Re-Assessment / Re-Certification        Patient: Larry Walters   : 1960  Diagnosis/ICD-10 Code:  Impaired functional mobility, balance, gait, and endurance [Z74.09]  Referring practitioner: Nikki Mcgarry MD  Date of Initial Evaluation:  Type: THERAPY  Noted: 10/13/2022  Patient seen for 3 sessions      Subjective:   Larry Walters reports: Feeling good. Just a little low back pain. Rates pain 0/10 sitting, 6/10 standing, 7/10 walking. I can stand 10 mins. I can walk for ~10 mins. My OT has asked me to walk 10 laps. From bedroom to kitchen = 1 lap. I can do 3 laps before needing to sit.    Subjective Questionnaire: Oswestry: 36/100  Clinical Progress: unchanged  Home Program Compliance: Yes  Treatment has included: therapeutic exercise and therapeutic activity    Subjective   Objective   Active Range of Motion      Lumbar   Flexion: WFL  Extension: 0 (Pain L anterior hip) degrees     Additional Active Range of Motion Details  Lateral flexion 50% WNL B  Rotation <25% WNL B     Strength/Myotome Testing      Left Hip   Planes of Motion   Flexion: 5  Abduction: Unable to roll to side on treatment table.     Right Hip   Planes of Motion   Flexion: 5  Right hip abductors strength: Unable to roll to side on treatment table.     Left Knee   Flexion: 5  Extension: 5     Right Knee   Flexion: 5  Extension: 5     Left Ankle/Foot   Dorsiflexion: 5     Right Ankle/Foot   Dorsiflexion: 5      Ambulation   Weight-Bearing Status   Assistive device used: quad cane     Ambulation: Level Surfaces   Ambulation with assistive device: independent  Ambulation without assistive device: SBA.     Ambulation: Stairs   Ascend stairs: step to, 1 rail  Descend stairs: step to, 1 rail     Observational Gait   Decreased walking speed and stride length.      Functional Assessment      Comments  5x STS: 30s  TUG: (w/ quad cane): 25s      Goals  Plan Goals: Short Term Goals: 4 weeks. Patient will:  1. Be independent with initial HEP MET  2. [place]  improved standing and walking tolerance >/=5 mins w/o limitation due to pain MET  3. Demo transverse abdominis activation independently w/ exercise MET  4. Self-report >10% improvement on Oswestry NOT MET    Long Term Goals: 6-12 weeks. Patient will:  1. Demonstrate improved Bilateral lower extremity MMT of >/= 4+/5 to allow for performance of daily activities without pain.  2. Demonstrate lumbar AROM WFL to allow for return to household & recreational activities w/o increased symptoms  3. Score w/in age matched norms for 5x STS and TUG functional testing demonstrating improved gait speed and endurance  4. Perceived disability </= 25% as measured by Oswestry  5. Be independent with long term HEP  ALL NOT MET    Assessment/Plan  Progress toward previous goals: Partially Met    Recommendations: Continue as planned  Timeframe: 2 months  Prognosis to achieve goals: poor (Previous bouts of PT w/o significant change)    PT Signature: Tanna Dick PT      Based upon review of the patient's progress and continued therapy plan, it is my medical opinion that Larry Walters should continue physical therapy treatment at [facility]  [address] [place] [id number] [phone].    Signature: __________________________________  Nikki Mcgarry MD    Manual Therapy:    0     mins  89003;  Therapeutic Exercise:    15     mins  23344;     Neuromuscular Ryne:    0    mins  14856;    Therapeutic Activity:     25     mins  34813;     Gait Trainin     mins  46010;     Ultrasound:     0     mins  52713;    Work Hardening           0      mins 04446  Iontophoresis               0   mins 68964    Timed Treatment:   40   mins   Total Treatment:     40   mins

## 2022-11-17 ENCOUNTER — TREATMENT (OUTPATIENT)
Dept: PHYSICAL THERAPY | Facility: CLINIC | Age: 62
End: 2022-11-17

## 2022-11-17 DIAGNOSIS — M54.50 CHRONIC MIDLINE LOW BACK PAIN WITHOUT SCIATICA: ICD-10-CM

## 2022-11-17 DIAGNOSIS — G89.29 CHRONIC MIDLINE LOW BACK PAIN WITHOUT SCIATICA: ICD-10-CM

## 2022-11-17 DIAGNOSIS — Z74.09 IMPAIRED FUNCTIONAL MOBILITY, BALANCE, GAIT, AND ENDURANCE: Primary | ICD-10-CM

## 2022-11-17 PROCEDURE — 97530 THERAPEUTIC ACTIVITIES: CPT | Performed by: PHYSICAL THERAPIST

## 2022-11-17 PROCEDURE — 97110 THERAPEUTIC EXERCISES: CPT | Performed by: PHYSICAL THERAPIST

## 2022-11-17 NOTE — PROGRESS NOTES
Physical Therapy Daily Treatment Note      Patient: Larry Walters   : 1960  Referring practitioner: Nikki Mcgarry MD  Date of Initial Visit: Type: THERAPY  Noted: 10/13/2022  Today's Date: 2022  Patient seen for 4 sessions       Visit Diagnoses:    ICD-10-CM ICD-9-CM   1. Impaired functional mobility, balance, gait, and endurance  Z74.09 V49.89   2. Chronic midline low back pain without sciatica  M54.50 724.2    G89.29 338.29       Subjective   No new changes. No pain currently.    Objective   See Exercise, Manual, and Modality Logs for complete treatment.       Assessment/Plan  No c/o pain throughout session. Requires frequent verbal cuing for upright posture. Limited balance on foam or with staggered stance, requiring SBA for safety. Progress per POC.    Timed:         Manual Therapy:    0     mins  20939;     Therapeutic Exercise:    15     mins  25731;     Neuromuscular Ryne:    0    mins  07778;    Therapeutic Activity:     30     mins  82149;     Gait Trainin     mins  96279;     Ultrasound:     0     mins  92919;    Ionto                               0    mins   02933        Timed Treatment:   45   mins   Total Treatment:     45   mins    Tanna Dick, PT  [place] License: [id number]

## 2022-12-01 ENCOUNTER — TREATMENT (OUTPATIENT)
Dept: PHYSICAL THERAPY | Facility: CLINIC | Age: 62
End: 2022-12-01

## 2022-12-01 DIAGNOSIS — M54.50 CHRONIC MIDLINE LOW BACK PAIN WITHOUT SCIATICA: ICD-10-CM

## 2022-12-01 DIAGNOSIS — G89.29 CHRONIC MIDLINE LOW BACK PAIN WITHOUT SCIATICA: ICD-10-CM

## 2022-12-01 DIAGNOSIS — Z74.09 IMPAIRED FUNCTIONAL MOBILITY, BALANCE, GAIT, AND ENDURANCE: Primary | ICD-10-CM

## 2022-12-01 PROCEDURE — 97530 THERAPEUTIC ACTIVITIES: CPT | Performed by: PHYSICAL THERAPIST

## 2022-12-01 PROCEDURE — 97110 THERAPEUTIC EXERCISES: CPT | Performed by: PHYSICAL THERAPIST

## 2022-12-01 NOTE — PROGRESS NOTES
Physical Therapy Daily Treatment Note      Patient: Larry Walters   : 1960  Referring practitioner: Nikki Mcgarry MD  Date of Initial Visit: Type: THERAPY  Noted: 10/13/2022  Today's Date: 2022  Patient seen for 5 sessions       Visit Diagnoses:    ICD-10-CM ICD-9-CM   1. Impaired functional mobility, balance, gait, and endurance  Z74.09 V49.89   2. Chronic midline low back pain without sciatica  M54.50 724.2    G89.29 338.29       Subjective   No c/o pain. I left cane in car because Nery (caregiver) asked me too.    Objective   See Exercise, Manual, and Modality Logs for complete treatment.     Assessment/Plan  Recommend use of cane or walker due to fall risk. No c/o pain w/ exercise. Pt requires intermittent hand hold assist when standing on foam due to fatigue/poor balance. Progress per POC.    Timed:         Manual Therapy:    0     mins  53494;     Therapeutic Exercise:    15     mins  98814;     Neuromuscular Ryne:    0    mins  18720;    Therapeutic Activity:     25     mins  50552;     Gait Trainin     mins  14826;     Ultrasound:     0     mins  21922;    Ionto                               0    mins   94688        Timed Treatment:   40   mins   Total Treatment:     40   mins    Tanna Dick PT  [place] License: [id number]

## 2022-12-15 ENCOUNTER — TREATMENT (OUTPATIENT)
Dept: PHYSICAL THERAPY | Facility: CLINIC | Age: 62
End: 2022-12-15

## 2022-12-15 DIAGNOSIS — Z74.09 IMPAIRED FUNCTIONAL MOBILITY, BALANCE, GAIT, AND ENDURANCE: Primary | ICD-10-CM

## 2022-12-15 DIAGNOSIS — M54.50 CHRONIC MIDLINE LOW BACK PAIN WITHOUT SCIATICA: ICD-10-CM

## 2022-12-15 DIAGNOSIS — G89.29 CHRONIC MIDLINE LOW BACK PAIN WITHOUT SCIATICA: ICD-10-CM

## 2022-12-15 PROCEDURE — 97530 THERAPEUTIC ACTIVITIES: CPT | Performed by: PHYSICAL THERAPIST

## 2022-12-15 PROCEDURE — 97110 THERAPEUTIC EXERCISES: CPT | Performed by: PHYSICAL THERAPIST

## 2022-12-15 NOTE — PROGRESS NOTES
Physical Therapy Daily Treatment Note      Patient: Larry Walters   : 1960  Referring practitioner: Nikki Mcgarry MD  Date of Initial Visit: Type: THERAPY  Noted: 10/13/2022  Today's Date: 12/15/2022  Patient seen for 6 sessions       Visit Diagnoses:    ICD-10-CM ICD-9-CM   1. Impaired functional mobility, balance, gait, and endurance  Z74.09 V49.89   2. Chronic midline low back pain without sciatica  M54.50 724.2    G89.29 338.29       Subjective   My back is feeling better. I am doing my home exercises. No pain today.    Objective   See Exercise, Manual, and Modality Logs for complete treatment.       Assessment/Plan  No c/o pain w/ exercise. LEs visibly fatigued w/ unsupported stance on foam x60s. Requires intermittent cuing for upright posture. Progress per POC.      Timed:         Manual Therapy:    0     mins  63951;     Therapeutic Exercise:    15     mins  89477;     Neuromuscular Ryne:    0    mins  47008;    Therapeutic Activity:     15     mins  84243;     Gait Trainin     mins  79937;     Ultrasound:     0     mins  96389;    Ionto                               0    mins   61578        Timed Treatment:   30   mins   Total Treatment:     30   mins    Tanna Dick, PT  [place] License: [id number]

## 2022-12-29 ENCOUNTER — TREATMENT (OUTPATIENT)
Dept: PHYSICAL THERAPY | Facility: CLINIC | Age: 62
End: 2022-12-29

## 2022-12-29 DIAGNOSIS — G89.29 CHRONIC MIDLINE LOW BACK PAIN WITHOUT SCIATICA: ICD-10-CM

## 2022-12-29 DIAGNOSIS — M54.50 CHRONIC MIDLINE LOW BACK PAIN WITHOUT SCIATICA: ICD-10-CM

## 2022-12-29 DIAGNOSIS — Z74.09 IMPAIRED FUNCTIONAL MOBILITY, BALANCE, GAIT, AND ENDURANCE: Primary | ICD-10-CM

## 2022-12-29 PROCEDURE — 97110 THERAPEUTIC EXERCISES: CPT | Performed by: PHYSICAL THERAPIST

## 2022-12-29 PROCEDURE — 97530 THERAPEUTIC ACTIVITIES: CPT | Performed by: PHYSICAL THERAPIST

## 2022-12-29 NOTE — PROGRESS NOTES
Re-Assessment / Re-Certification        Patient: Larry Walters   : 1960  Diagnosis/ICD-10 Code:  Impaired functional mobility, balance, gait, and endurance [Z74.09]  Referring practitioner: Nikki Mcgarry MD  Date of Initial Evaluation:  Type: THERAPY  Noted: 10/13/2022  Patient seen for 7 sessions      Subjective:   Larry Walters reports: No pain currently. I left my cane at home, because we didn't have room in the car. Rates pain 4/10 on Oswestry post-session (no c/o pain prior to or during session).  Subjective Questionnaire: Oswestry:   Clinical Progress: unchanged  Home Program Compliance: Yes  Treatment has included: therapeutic exercise, therapeutic activity and gait training    Subjective   Objective   Active Range of Motion      Lumbar   Flexion: WFL  Extension: 0 (Reports no pain) degrees     Additional Active Range of Motion Details  Lateral flexion 50% WNL B  Rotation <25% WNL B     Strength/Myotome Testing      Left Hip   Planes of Motion   Flexion: 5  Abduction: 4+   Extension: Unable to roll prone on treatment table.    Right Hip   Planes of Motion   Flexion: 5  Right hip abductors strength: 4+  Extension: Unable to roll prone on treatment table.     Left Knee   Flexion: 5  Extension: 5     Right Knee   Flexion: 5  Extension: 5     Left Ankle/Foot   Dorsiflexion: 5     Right Ankle/Foot   Dorsiflexion: 5      Ambulation   Weight-Bearing Status   Assistive device used: None     Ambulation: Level Surfaces   Ambulation with assistive device: independent  Ambulation without assistive device: SBA.     Ambulation: Stairs   Ascend stairs: step to, 1 rail  Descend stairs: step to, 1 rail     Observational Gait   Decreased walking speed and stride length.      Functional Assessment      Comments  5x STS: 30s  TUG: (no AD): 26s     Goals  Plan Goals: Short Term Goals: 4 weeks. Patient will:  1. Be independent with initial HEP MET  2. Demo improved standing and walking tolerance >/=5 mins w/o limitation due  to pain MET  3. Demo transverse abdominis activation independently w/ exercise MET  4. Self-report >10% improvement on Oswestry  MET    Long Term Goals: 6-12 weeks. Patient will:  1. Demonstrate improved Bilateral lower extremity MMT of >/= 4+/5 to allow for performance of daily activities without pain. MET  2. Demonstrate lumbar AROM WFL to allow for return to household & recreational activities w/o increased symptoms MET  3. Score w/in age matched norms for 5x STS and TUG functional testing demonstrating improved gait speed and endurance NOT MET  4. Perceived disability </= 25% as measured by Oswestry NOT MET (26%)  5. Be independent with long term HEP MET    Assessment/Plan  Progress toward previous goals: Partially Met    Pt has met 7/9 goals. No improvement in TUG and 5x STS. No c/o pain prior to or during session. Continues to have impaired gait w/ R knee OA/valgus. Recommend cane at all times to offload R LE and to decrease fall risk.      Recommendations: Continue with recommendations DC to HEP  Timeframe: 2 weeks  Prognosis to achieve goals: poor    PT Signature: Tanna Dick PT      Based upon review of the patient's progress and continued therapy plan, it is my medical opinion that Larry Walters should continue physical therapy treatment at [facility]  [address] [place] [id number] [phone].    Signature: __________________________________  Nikki Mcgarry MD    Manual Therapy:    0     mins  72884;  Therapeutic Exercise:    15     mins  89084;     Neuromuscular Ryne:    0    mins  97138;    Therapeutic Activity:     30     mins  37085;     Gait Trainin     mins  26481;     Ultrasound:     0     mins  50050;    Work Hardening           0      mins 33430  Iontophoresis               0   mins 01634    Timed Treatment:   45   mins   Total Treatment:     45   mins

## 2023-01-05 ENCOUNTER — TREATMENT (OUTPATIENT)
Dept: PHYSICAL THERAPY | Facility: CLINIC | Age: 63
End: 2023-01-05
Payer: MEDICARE

## 2023-01-05 DIAGNOSIS — M54.50 CHRONIC MIDLINE LOW BACK PAIN WITHOUT SCIATICA: ICD-10-CM

## 2023-01-05 DIAGNOSIS — G89.29 CHRONIC MIDLINE LOW BACK PAIN WITHOUT SCIATICA: ICD-10-CM

## 2023-01-05 DIAGNOSIS — Z74.09 IMPAIRED FUNCTIONAL MOBILITY, BALANCE, GAIT, AND ENDURANCE: Primary | ICD-10-CM

## 2023-01-05 PROCEDURE — 97530 THERAPEUTIC ACTIVITIES: CPT | Performed by: PHYSICAL THERAPIST

## 2023-01-05 PROCEDURE — 97110 THERAPEUTIC EXERCISES: CPT | Performed by: PHYSICAL THERAPIST

## 2023-01-05 NOTE — PATIENT INSTRUCTIONS
Access Code: [email or web address]  URL: [email or web address]  Date: 01/05/2023  Prepared by: Tanna Dick    Exercises  Sit to Stand with Armchair - 1 x daily - 7 x weekly - 1 sets - 10 reps  Seated Long Arc Quad with Hip Adduction - 1 x daily - 7 x weekly - 2 sets - 15 reps  Side Stepping with Counter Support - 1 x daily - 7 x weekly - 5 sets - 10 reps  Alternating Backward Step - 1 x daily - 7 x weekly - 1 sets - 20 reps  Standing Toe Taps - 1 x daily - 7 x weekly - 1 sets - 20 reps  Tandem Stance - 1 x daily - 7 x weekly - 2 sets - 30s hold  Active Straight Leg Raise with Quad Set - 1 x daily - 7 x weekly - 1 sets - 20 reps  Supine Bridge - 1 x daily - 7 x weekly - 1 sets - 15 reps  Shoulder extension with resistance - Neutral - 1 x daily - 7 x weekly - 1 sets - 20 reps - 3s hold

## 2023-01-05 NOTE — PROGRESS NOTES
Physical Therapy Daily Treatment Note      Patient: Larry Walters   : 1960  Referring practitioner: Erci Regan MD  Date of Initial Visit: Type: THERAPY  Noted: 10/13/2022  Today's Date: 2023  Patient seen for 8 sessions       Visit Diagnoses:    ICD-10-CM ICD-9-CM   1. Impaired functional mobility, balance, gait, and endurance  Z74.09 V49.89   2. Chronic midline low back pain without sciatica  M54.50 724.2    G89.29 338.29       Subjective   No complaints.    Objective   See Exercise, Manual, and Modality Logs for complete treatment.       Assessment/Plan  Spoke with patient and caregiver about reassessment findings: met 7 goals, no complaint of pain w/ 30+ mins of exercise, plateau w/ 5x STS or TUG. Gait largely impaired by R knee OA. Plan to DC to HEP at this time. Reviewed HEP and issued new copy  Recommend licensed  to maintain exercise >/=3x/week.  Pt is welcome to return if symptoms worsen or new issues arise.    Timed:         Manual Therapy:    0     mins  82896;     Therapeutic Exercise:    15     mins  96478;     Neuromuscular Ryne:    0    mins  31906;    Therapeutic Activity:     15     mins  46093;     Gait Trainin     mins  74097;     Ultrasound:     0     mins  55795;    Ionto                               0    mins   38279        Timed Treatment:   30   mins   Total Treatment:     30   mins    ADRIANNA Morales License: [id number]

## 2023-03-02 ENCOUNTER — TRANSCRIBE ORDERS (OUTPATIENT)
Dept: ADMINISTRATIVE | Facility: HOSPITAL | Age: 63
End: 2023-03-02
Payer: MEDICARE

## 2023-03-02 DIAGNOSIS — N20.0 KIDNEY STONE: Primary | ICD-10-CM

## 2023-03-20 NOTE — PROGRESS NOTES
Physical Therapy Daily Treatment Note      Visit # 12      Subjective   No new changes.    Objective   See Exercise, Manual, and Modality Logs for complete treatment.       Assessment/Plan  Excellent tolerance to all exercise w/o c/o. Continues to have decreased gait speed and step length w/ forward flexed posture. Progress per POC.         Manual Therapy:    0     mins  43515;  Therapeutic Exercise:    25     mins  40827;     Neuromuscular Ryne:    0    mins  26764;    Therapeutic Activity:     10     mins  54239;     Gait Training:      10     mins  26722;     Ultrasound:     0     mins  50414;    Work Hardening           0      mins 43734  Iontophoresis               0   mins 84314    Timed Treatment:   45   mins   Total Treatment:     55   mins    Tanna Zaragoza, PT  Physical Therapist   2-point gait

## 2023-06-16 ENCOUNTER — TRANSCRIBE ORDERS (OUTPATIENT)
Dept: PHYSICAL THERAPY | Facility: CLINIC | Age: 63
End: 2023-06-16
Payer: MEDICARE

## 2023-06-16 DIAGNOSIS — M25.569 KNEE PAIN, UNSPECIFIED CHRONICITY, UNSPECIFIED LATERALITY: ICD-10-CM

## 2023-06-16 DIAGNOSIS — M54.50 LOW BACK PAIN, UNSPECIFIED BACK PAIN LATERALITY, UNSPECIFIED CHRONICITY, UNSPECIFIED WHETHER SCIATICA PRESENT: Primary | ICD-10-CM

## 2023-06-16 DIAGNOSIS — M25.519 SHOULDER PAIN, UNSPECIFIED CHRONICITY, UNSPECIFIED LATERALITY: ICD-10-CM

## 2023-09-26 ENCOUNTER — HOSPITAL ENCOUNTER (OUTPATIENT)
Dept: CT IMAGING | Facility: HOSPITAL | Age: 63
Discharge: HOME | End: 2023-09-26
Admitting: UROLOGY
Payer: MEDICARE

## 2023-09-26 DIAGNOSIS — N20.0 KIDNEY STONE: ICD-10-CM

## 2023-09-26 PROCEDURE — 74178 CT ABD&PLV WO CNTR FLWD CNTR: CPT

## 2023-09-26 PROCEDURE — 25510000001 IOPAMIDOL 61 % SOLUTION: Performed by: UROLOGY

## 2023-09-26 PROCEDURE — 82565 ASSAY OF CREATININE: CPT

## 2023-09-26 RX ADMIN — IOPAMIDOL 95 ML: 612 INJECTION, SOLUTION INTRAVENOUS at 09:36

## 2023-09-27 LAB — CREAT BLDA-MCNC: 0.9 MG/DL (ref 0.6–1.3)

## 2023-12-22 ENCOUNTER — TRANSCRIBE ORDERS (OUTPATIENT)
Dept: ADMINISTRATIVE | Facility: HOSPITAL | Age: 63
End: 2023-12-22
Payer: MEDICARE

## 2023-12-22 DIAGNOSIS — N28.89 KIDNEY MASS: Primary | ICD-10-CM
